# Patient Record
Sex: FEMALE | Race: OTHER | HISPANIC OR LATINO | URBAN - METROPOLITAN AREA
[De-identification: names, ages, dates, MRNs, and addresses within clinical notes are randomized per-mention and may not be internally consistent; named-entity substitution may affect disease eponyms.]

---

## 2017-07-15 ENCOUNTER — EMERGENCY (EMERGENCY)
Facility: HOSPITAL | Age: 42
LOS: 0 days | Discharge: HOME | End: 2017-07-15
Attending: EMERGENCY MEDICINE | Admitting: EMERGENCY MEDICINE
Payer: MEDICAID

## 2017-07-15 VITALS
TEMPERATURE: 98 F | RESPIRATION RATE: 16 BRPM | OXYGEN SATURATION: 100 % | SYSTOLIC BLOOD PRESSURE: 112 MMHG | HEART RATE: 63 BPM | DIASTOLIC BLOOD PRESSURE: 62 MMHG

## 2017-07-15 VITALS
RESPIRATION RATE: 18 BRPM | SYSTOLIC BLOOD PRESSURE: 109 MMHG | HEART RATE: 130 BPM | DIASTOLIC BLOOD PRESSURE: 69 MMHG | TEMPERATURE: 98 F | OXYGEN SATURATION: 100 %
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
## 2017-07-15 DIAGNOSIS — S09.90XA UNSPECIFIED INJURY OF HEAD, INITIAL ENCOUNTER: ICD-10-CM
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
## 2017-07-15 PROCEDURE — 70450 CT HEAD/BRAIN W/O DYE: CPT | Mod: 26

## 2017-07-15 PROCEDURE — 72125 CT NECK SPINE W/O DYE: CPT | Mod: 26

## 2017-07-15 PROCEDURE — 99284 EMERGENCY DEPT VISIT MOD MDM: CPT

## 2017-07-15 RX ORDER — ACETAMINOPHEN 500 MG
1000 TABLET ORAL ONCE
Qty: 0 | Refills: 0 | Status: DISCONTINUED | OUTPATIENT
Start: 2017-07-15 | End: 2017-07-15

## 2017-07-15 NOTE — ED STATDOCS - ATTENDING CONTRIBUTION TO CARE
Attending Contribution to Care: I, Karina Stout, performed the initial face to face bedside interview with this patient regarding history of present illness, review of symptoms and relevant past medical, social and family history.  I completed an independent physical examination.  I was the initial provider who evaluated this patient. I have signed out the follow up of any pending tests (i.e. labs, radiological studies) to the resident.  I have communicated the patient’s plan of care and disposition with the resident.

## 2017-07-15 NOTE — ED STATDOCS - CHPI ED SYMPTOM NEG
no nausea/no blood in stool/no fever/no dysuria/no burning urination/no vomiting/no palpitations/no abdominal distention/no diarrhea/no hematuria

## 2017-07-15 NOTE — ED STATDOCS - OBJECTIVE STATEMENT
41 y/o F presents to the ED c/o head pain. The pt provides that she missed a step yesterday and hit her head. The pt notes that she has left scalp swelling, and generalized headache. No h/o LOC, other trauma, abd pain, nvd, cp, cough, sob, or urinary incontinence.

## 2017-07-15 NOTE — ED STATDOCS - PROGRESS NOTE DETAILS
MD Constantine PGY-4: 43 yo F w/ no pmhx presents w/ headache s/p mechanical fall yesterday. Reports moderate occipital headache that began last night after fall radiating down to b/l neck. Denies visual changes, weakness, nausea. VS with tachcyardia. HR retaken by me, 64 bpm. regular rhythm. MMM. Neuro: GCS 15. Pt alert, NAD, ambulates with ease in ED. normocephalic atraumatic. no spinal tenderness. CN II-XII grossly intact. full strength and sensation b/l. neg pronator drift, normal gait. Plan: pain control, ct head and cspine reassess No acute findingons on CT, rec PMD f/u, reviewed head injury precautions and return precautions -Constantino Rosas PA-C

## 2017-12-16 ENCOUNTER — EMERGENCY (EMERGENCY)
Facility: HOSPITAL | Age: 42
LOS: 0 days | Discharge: HOME | End: 2017-12-16
Attending: EMERGENCY MEDICINE
Payer: COMMERCIAL

## 2017-12-16 VITALS
HEART RATE: 70 BPM | RESPIRATION RATE: 16 BRPM | OXYGEN SATURATION: 99 % | TEMPERATURE: 98 F | DIASTOLIC BLOOD PRESSURE: 60 MMHG | SYSTOLIC BLOOD PRESSURE: 112 MMHG

## 2017-12-16 VITALS — WEIGHT: 130.07 LBS | HEIGHT: 61 IN

## 2017-12-16 DIAGNOSIS — M54.5 LOW BACK PAIN: ICD-10-CM

## 2017-12-16 DIAGNOSIS — M25.551 PAIN IN RIGHT HIP: ICD-10-CM

## 2017-12-16 DIAGNOSIS — N39.0 URINARY TRACT INFECTION, SITE NOT SPECIFIED: ICD-10-CM

## 2017-12-16 LAB
APPEARANCE UR: CLEAR — SIGNIFICANT CHANGE UP
BILIRUB UR-MCNC: NEGATIVE — SIGNIFICANT CHANGE UP
COLOR SPEC: YELLOW — SIGNIFICANT CHANGE UP
DIFF PNL FLD: (no result)
GLUCOSE UR QL: NEGATIVE MG/DL — SIGNIFICANT CHANGE UP
KETONES UR-MCNC: NEGATIVE — SIGNIFICANT CHANGE UP
LEUKOCYTE ESTERASE UR-ACNC: (no result)
NITRITE UR-MCNC: NEGATIVE — SIGNIFICANT CHANGE UP
PH UR: 5 — SIGNIFICANT CHANGE UP (ref 5–8)
PROT UR-MCNC: NEGATIVE MG/DL — SIGNIFICANT CHANGE UP
SP GR SPEC: 1.02 — SIGNIFICANT CHANGE UP (ref 1.01–1.02)
UROBILINOGEN FLD QL: NEGATIVE MG/DL — SIGNIFICANT CHANGE UP

## 2017-12-16 PROCEDURE — 99283 EMERGENCY DEPT VISIT LOW MDM: CPT

## 2017-12-16 PROCEDURE — 72190 X-RAY EXAM OF PELVIS: CPT | Mod: 26

## 2017-12-16 PROCEDURE — 72100 X-RAY EXAM L-S SPINE 2/3 VWS: CPT | Mod: 26

## 2017-12-16 RX ORDER — LIDOCAINE 4 G/100G
1 CREAM TOPICAL ONCE
Qty: 0 | Refills: 0 | Status: COMPLETED | OUTPATIENT
Start: 2017-12-16 | End: 2017-12-16

## 2017-12-16 RX ORDER — NITROFURANTOIN MACROCRYSTAL 50 MG
1 CAPSULE ORAL
Qty: 14 | Refills: 0
Start: 2017-12-16 | End: 2017-12-22

## 2017-12-16 RX ORDER — LIDOCAINE 4 G/100G
1 CREAM TOPICAL
Qty: 5 | Refills: 0
Start: 2017-12-16

## 2017-12-16 RX ORDER — CYCLOBENZAPRINE HYDROCHLORIDE 10 MG/1
1 TABLET, FILM COATED ORAL
Qty: 5 | Refills: 0
Start: 2017-12-16

## 2017-12-16 RX ORDER — NITROFURANTOIN MACROCRYSTAL 50 MG
100 CAPSULE ORAL ONCE
Qty: 0 | Refills: 0 | Status: COMPLETED | OUTPATIENT
Start: 2017-12-16 | End: 2017-12-16

## 2017-12-16 RX ORDER — ACETAMINOPHEN 500 MG
975 TABLET ORAL ONCE
Qty: 0 | Refills: 0 | Status: COMPLETED | OUTPATIENT
Start: 2017-12-16 | End: 2017-12-16

## 2017-12-16 RX ORDER — CYCLOBENZAPRINE HYDROCHLORIDE 10 MG/1
10 TABLET, FILM COATED ORAL ONCE
Qty: 0 | Refills: 0 | Status: DISCONTINUED | OUTPATIENT
Start: 2017-12-16 | End: 2017-12-16

## 2017-12-16 RX ORDER — IBUPROFEN 200 MG
600 TABLET ORAL ONCE
Qty: 0 | Refills: 0 | Status: COMPLETED | OUTPATIENT
Start: 2017-12-16 | End: 2017-12-16

## 2017-12-16 RX ADMIN — Medication 100 MILLIGRAM(S): at 20:35

## 2017-12-16 RX ADMIN — Medication 975 MILLIGRAM(S): at 18:59

## 2017-12-16 RX ADMIN — LIDOCAINE 1 PATCH: 4 CREAM TOPICAL at 19:55

## 2017-12-16 RX ADMIN — Medication 600 MILLIGRAM(S): at 18:59

## 2017-12-16 NOTE — ED STATDOCS - ATTENDING CONTRIBUTION TO CARE
I, Priyank Vega MD,  performed the initial face to face bedside interview with this patient regarding history of present illness, review of symptoms and relevant past medical, social and family history.  I completed an independent physical examination.  I was the initial provider who evaluated this patient. I have signed out the follow up of any pending tests (i.e. labs, radiological studies) to the ACP.  I have communicated the patient’s plan of care and disposition with the ACP.  The history, relevant review of systems, past medical and surgical history, medical decision making, and physical examination was documented by the scribe in my presence and I attest to the accuracy of the documentation.

## 2017-12-16 NOTE — ED STATDOCS - PROGRESS NOTE DETAILS
MAINOR Main:  Pt seen and examined with intake provider, presented with c/o right lower back pain, denies any bowel or bladder incontinence, saddle anesthesia, trauma or LE weakness. Denies any urinary complaint. PE: holding right back, sitting in chair, S1, S2, RRR, no midline spine Tenderness, TTP at the right paraspinal at the lumbar. Plan: xray, pain meds, UA and reeval. MAINOR Price Gadit:  Xray negative, UA positive for UTI, Pain is better. Will dc home.
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
## 2017-12-16 NOTE — ED STATDOCS - NEUROLOGICAL, MLM
sensation is normal and strength is normal.  Sensation grossly intact bilateral lower extremities.  5/5 strength bilateral lower extremities.
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
## 2019-02-20 ENCOUNTER — EMERGENCY (EMERGENCY)
Facility: HOSPITAL | Age: 44
LOS: 1 days | Discharge: HOME | End: 2019-02-20
Attending: EMERGENCY MEDICINE
Payer: MEDICAID

## 2019-02-20 VITALS
OXYGEN SATURATION: 100 % | TEMPERATURE: 100 F | SYSTOLIC BLOOD PRESSURE: 125 MMHG | HEART RATE: 103 BPM | RESPIRATION RATE: 19 BRPM | DIASTOLIC BLOOD PRESSURE: 65 MMHG

## 2019-02-20 VITALS
DIASTOLIC BLOOD PRESSURE: 59 MMHG | OXYGEN SATURATION: 100 % | RESPIRATION RATE: 16 BRPM | SYSTOLIC BLOOD PRESSURE: 103 MMHG | HEART RATE: 69 BPM

## 2019-02-20 DIAGNOSIS — R50.9 FEVER, UNSPECIFIED: ICD-10-CM

## 2019-02-20 DIAGNOSIS — J06.9 ACUTE UPPER RESPIRATORY INFECTION, UNSPECIFIED: ICD-10-CM

## 2019-02-20 LAB
ALBUMIN SERPL ELPH-MCNC: 4 G/DL — SIGNIFICANT CHANGE UP (ref 3.3–5)
ALP SERPL-CCNC: 80 U/L — SIGNIFICANT CHANGE UP (ref 40–120)
ALT FLD-CCNC: 20 U/L — SIGNIFICANT CHANGE UP (ref 12–78)
ANION GAP SERPL CALC-SCNC: 5 MMOL/L — SIGNIFICANT CHANGE UP (ref 5–17)
AST SERPL-CCNC: 10 U/L — LOW (ref 15–37)
BASOPHILS # BLD AUTO: 0.01 K/UL — SIGNIFICANT CHANGE UP (ref 0–0.2)
BASOPHILS NFR BLD AUTO: 0.1 % — SIGNIFICANT CHANGE UP (ref 0–2)
BILIRUB SERPL-MCNC: 0.3 MG/DL — SIGNIFICANT CHANGE UP (ref 0.2–1.2)
BLOOD GAS SOURCE: SIGNIFICANT CHANGE UP
BUN SERPL-MCNC: 8 MG/DL — SIGNIFICANT CHANGE UP (ref 7–23)
CALCIUM SERPL-MCNC: 8.3 MG/DL — LOW (ref 8.5–10.1)
CHLORIDE SERPL-SCNC: 106 MMOL/L — SIGNIFICANT CHANGE UP (ref 96–108)
CO2 SERPL-SCNC: 26 MMOL/L — SIGNIFICANT CHANGE UP (ref 22–31)
COHGB MFR BLDV: 2 % — HIGH (ref 0–1.5)
CREAT SERPL-MCNC: 0.8 MG/DL — SIGNIFICANT CHANGE UP (ref 0.5–1.3)
EOSINOPHIL # BLD AUTO: 0.42 K/UL — SIGNIFICANT CHANGE UP (ref 0–0.5)
EOSINOPHIL NFR BLD AUTO: 5.5 % — SIGNIFICANT CHANGE UP (ref 0–6)
GLUCOSE SERPL-MCNC: 125 MG/DL — HIGH (ref 70–99)
HCT VFR BLD CALC: 38.2 % — SIGNIFICANT CHANGE UP (ref 34.5–45)
HGB BLD-MCNC: 12.9 G/DL — SIGNIFICANT CHANGE UP (ref 11.5–15.5)
IMM GRANULOCYTES NFR BLD AUTO: 0.3 % — SIGNIFICANT CHANGE UP (ref 0–1.5)
LACTATE SERPL-SCNC: 1.6 MMOL/L — SIGNIFICANT CHANGE UP (ref 0.7–2)
LYMPHOCYTES # BLD AUTO: 0.26 K/UL — LOW (ref 1–3.3)
LYMPHOCYTES # BLD AUTO: 3.4 % — LOW (ref 13–44)
MCHC RBC-ENTMCNC: 29.2 PG — SIGNIFICANT CHANGE UP (ref 27–34)
MCHC RBC-ENTMCNC: 33.8 GM/DL — SIGNIFICANT CHANGE UP (ref 32–36)
MCV RBC AUTO: 86.4 FL — SIGNIFICANT CHANGE UP (ref 80–100)
MONOCYTES # BLD AUTO: 0.37 K/UL — SIGNIFICANT CHANGE UP (ref 0–0.9)
MONOCYTES NFR BLD AUTO: 4.8 % — SIGNIFICANT CHANGE UP (ref 2–14)
NEUTROPHILS # BLD AUTO: 6.58 K/UL — SIGNIFICANT CHANGE UP (ref 1.8–7.4)
NEUTROPHILS NFR BLD AUTO: 85.9 % — HIGH (ref 43–77)
NRBC # BLD: 0 /100 WBCS — SIGNIFICANT CHANGE UP (ref 0–0)
PLATELET # BLD AUTO: 222 K/UL — SIGNIFICANT CHANGE UP (ref 150–400)
POTASSIUM SERPL-MCNC: 3.8 MMOL/L — SIGNIFICANT CHANGE UP (ref 3.5–5.3)
POTASSIUM SERPL-SCNC: 3.8 MMOL/L — SIGNIFICANT CHANGE UP (ref 3.5–5.3)
PROT SERPL-MCNC: 8 GM/DL — SIGNIFICANT CHANGE UP (ref 6–8.3)
RBC # BLD: 4.42 M/UL — SIGNIFICANT CHANGE UP (ref 3.8–5.2)
RBC # FLD: 12.4 % — SIGNIFICANT CHANGE UP (ref 10.3–14.5)
SODIUM SERPL-SCNC: 137 MMOL/L — SIGNIFICANT CHANGE UP (ref 135–145)
WBC # BLD: 7.66 K/UL — SIGNIFICANT CHANGE UP (ref 3.8–10.5)
WBC # FLD AUTO: 7.66 K/UL — SIGNIFICANT CHANGE UP (ref 3.8–10.5)

## 2019-02-20 PROCEDURE — 99284 EMERGENCY DEPT VISIT MOD MDM: CPT

## 2019-02-20 PROCEDURE — 93010 ELECTROCARDIOGRAM REPORT: CPT

## 2019-02-20 PROCEDURE — 71046 X-RAY EXAM CHEST 2 VIEWS: CPT | Mod: 26

## 2019-02-20 RX ORDER — ACETAMINOPHEN 500 MG
650 TABLET ORAL ONCE
Qty: 0 | Refills: 0 | Status: COMPLETED | OUTPATIENT
Start: 2019-02-20 | End: 2019-02-20

## 2019-02-20 RX ORDER — SODIUM CHLORIDE 9 MG/ML
1000 INJECTION INTRAMUSCULAR; INTRAVENOUS; SUBCUTANEOUS ONCE
Qty: 0 | Refills: 0 | Status: COMPLETED | OUTPATIENT
Start: 2019-02-20 | End: 2019-02-20

## 2019-02-20 RX ADMIN — Medication 650 MILLIGRAM(S): at 19:13

## 2019-02-20 RX ADMIN — SODIUM CHLORIDE 1000 MILLILITER(S): 9 INJECTION INTRAMUSCULAR; INTRAVENOUS; SUBCUTANEOUS at 18:00

## 2019-02-20 RX ADMIN — SODIUM CHLORIDE 1000 MILLILITER(S): 9 INJECTION INTRAMUSCULAR; INTRAVENOUS; SUBCUTANEOUS at 19:48
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
## 2019-02-20 NOTE — ED STATDOCS - NS ED ROS FT
Constitutional: + fever no chills  Eyes: No visual changes  HEENT: + throat pain  CV: No chest pain  Resp: no SOB + cough  GI: No abd pain, nausea or vomiting  : No dysuria  MSK: No musculoskeletal pain  Skin: No rash  Neuro: + headache

## 2019-02-20 NOTE — ED STATDOCS - NS_ ATTENDINGSCRIBEDETAILS _ED_A_ED_FT
I, Anthony Villanueva MD,  performed the initial face to face bedside interview with this patient regarding history of present illness, review of symptoms and relevant past medical, social and family history.  I completed an independent physical examination.  I was the initial provider who evaluated this patient.  The history, relevant review of systems, past medical and surgical history, medical decision making, and physical examination was documented by the scribe in my presence and I attest to the accuracy of the documentation.

## 2019-02-20 NOTE — ED ADULT NURSE NOTE - NSIMPLEMENTINTERV_GEN_ALL_ED
Implemented All Universal Safety Interventions:  [place] to call system. Call bell, personal items and telephone within reach. Instruct patient to call for assistance. Room bathroom lighting operational. Non-slip footwear when patient is off stretcher. Physically safe environment: no spills, clutter or unnecessary equipment. Stretcher in lowest position, wheels locked, appropriate side rails in place.

## 2019-02-20 NOTE — ED STATDOCS - PHYSICAL EXAMINATION
Constitutional: mild distress AAOx3  Eyes: PERRLA EOMI  Head: Normocephalic atraumatic  Mouth: MMM  Cardiac:  tachycardic  Resp: Lungs CTAB  GI: Abd s/nt/nd  Neuro: CN2-12 intact  Skin: No rashes
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
## 2019-09-22 ENCOUNTER — EMERGENCY (EMERGENCY)
Facility: HOSPITAL | Age: 44
LOS: 0 days | Discharge: HOME | End: 2019-09-22
Attending: EMERGENCY MEDICINE
Payer: MEDICAID

## 2019-09-22 VITALS
OXYGEN SATURATION: 99 % | TEMPERATURE: 98 F | HEART RATE: 66 BPM | DIASTOLIC BLOOD PRESSURE: 66 MMHG | RESPIRATION RATE: 16 BRPM | SYSTOLIC BLOOD PRESSURE: 104 MMHG

## 2019-09-22 VITALS
HEART RATE: 70 BPM | DIASTOLIC BLOOD PRESSURE: 92 MMHG | SYSTOLIC BLOOD PRESSURE: 171 MMHG | TEMPERATURE: 98 F | WEIGHT: 147.93 LBS | HEIGHT: 61 IN | RESPIRATION RATE: 17 BRPM
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
## 2019-09-22 DIAGNOSIS — M25.522 PAIN IN LEFT ELBOW: ICD-10-CM
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
## 2019-09-22 DIAGNOSIS — S53.105A UNSPECIFIED DISLOCATION OF LEFT ULNOHUMERAL JOINT, INITIAL ENCOUNTER: ICD-10-CM
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
## 2019-09-22 PROCEDURE — 73080 X-RAY EXAM OF ELBOW: CPT | Mod: 26,LT,77

## 2019-09-22 PROCEDURE — 73060 X-RAY EXAM OF HUMERUS: CPT | Mod: LT

## 2019-09-22 PROCEDURE — 73060 X-RAY EXAM OF HUMERUS: CPT | Mod: 26,LT

## 2019-09-22 PROCEDURE — 73030 X-RAY EXAM OF SHOULDER: CPT | Mod: 26,LT

## 2019-09-22 PROCEDURE — 73080 X-RAY EXAM OF ELBOW: CPT | Mod: LT

## 2019-09-22 PROCEDURE — 24600 TX CLSD ELBOW DISLC W/O ANES: CPT | Mod: LT

## 2019-09-22 PROCEDURE — 96374 THER/PROPH/DIAG INJ IV PUSH: CPT | Mod: 59

## 2019-09-22 PROCEDURE — 99285 EMERGENCY DEPT VISIT HI MDM: CPT | Mod: 25

## 2019-09-22 PROCEDURE — 73100 X-RAY EXAM OF WRIST: CPT | Mod: 26,LT

## 2019-09-22 PROCEDURE — 73080 X-RAY EXAM OF ELBOW: CPT | Mod: 26,LT,76

## 2019-09-22 PROCEDURE — 99284 EMERGENCY DEPT VISIT MOD MDM: CPT

## 2019-09-22 PROCEDURE — 73100 X-RAY EXAM OF WRIST: CPT | Mod: LT

## 2019-09-22 PROCEDURE — 99284 EMERGENCY DEPT VISIT MOD MDM: CPT | Mod: 25

## 2019-09-22 PROCEDURE — 96375 TX/PRO/DX INJ NEW DRUG ADDON: CPT | Mod: 59

## 2019-09-22 PROCEDURE — 73030 X-RAY EXAM OF SHOULDER: CPT | Mod: LT

## 2019-09-22 RX ORDER — MORPHINE SULFATE 50 MG/1
4 CAPSULE, EXTENDED RELEASE ORAL ONCE
Refills: 0 | Status: DISCONTINUED | OUTPATIENT
Start: 2019-09-22 | End: 2019-09-22

## 2019-09-22 RX ORDER — ONDANSETRON 8 MG/1
4 TABLET, FILM COATED ORAL ONCE
Refills: 0 | Status: COMPLETED | OUTPATIENT
Start: 2019-09-22 | End: 2019-09-22

## 2019-09-22 RX ORDER — HYDROMORPHONE HYDROCHLORIDE 2 MG/ML
1 INJECTION INTRAMUSCULAR; INTRAVENOUS; SUBCUTANEOUS ONCE
Refills: 0 | Status: DISCONTINUED | OUTPATIENT
Start: 2019-09-22 | End: 2019-09-22

## 2019-09-22 RX ADMIN — MORPHINE SULFATE 4 MILLIGRAM(S): 50 CAPSULE, EXTENDED RELEASE ORAL at 08:15

## 2019-09-22 RX ADMIN — HYDROMORPHONE HYDROCHLORIDE 1 MILLIGRAM(S): 2 INJECTION INTRAMUSCULAR; INTRAVENOUS; SUBCUTANEOUS at 11:12

## 2019-09-22 RX ADMIN — MORPHINE SULFATE 4 MILLIGRAM(S): 50 CAPSULE, EXTENDED RELEASE ORAL at 10:00

## 2019-09-22 RX ADMIN — ONDANSETRON 4 MILLIGRAM(S): 8 TABLET, FILM COATED ORAL at 11:12

## 2019-09-22 NOTE — CONSULT NOTE ADULT - ASSESSMENT
******INCOMPLETE NOTE IN PROGRESS******  ******INCOMPLETE NOTE IN PROGRESS******  ******INCOMPLETE NOTE IN PROGRESS****** A/P: 44y F  w/ L Elbow Dislocation  s/p Closed reduction in the ED with Splinting     Case and imaging discussed with Dr. Gonzalez   Successful closed reduction performed, patient was stable from 30 degrees to 110  NWB LUE in splint and sling for comfort  Keep splint c/d/i  FU with Dr. Gonzalez in the office in 1-3 days, call for appointment   analgesia   discussed the signs and symptoms of compartment syndrome. explained to patient is arise to go to nearest ER, pt and  demonstrated understanding  no further ortho intervention at this time  ortho stable for DC  attending aware and agrees with above.
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
## 2019-09-22 NOTE — ED PROVIDER NOTE - OBJECTIVE STATEMENT
45 y/o female with no relevant PMHx presents to the ED s/p trip and fall this morning, landing on left side. No head strike. No LOC. Now c/o left shoulder and left elbow pain. +obvious deformity to left humerus. Denies head pain, neck pain, back pain. Pt was splinted by EMS and brought into ED, in moderate distress.

## 2019-09-22 NOTE — ED ADULT TRIAGE NOTE - CHIEF COMPLAINT QUOTE
Pt alert and oriented x3, pt presents s/p trip and fall. Denies head trauma LOC or blood thinners. Pt c/o 10/10 L wrist pain with deformity. +pulses and sensation intact.

## 2019-09-22 NOTE — ED PROVIDER NOTE - CONSTITUTIONAL, MLM
normal... Well appearing, well nourished, awake, alert, oriented to person, place, time/situation and in moderate distress. Normocephalic, atraumatic.

## 2019-09-22 NOTE — ED PROVIDER NOTE - CARE PROVIDER_API CALL
Mahad Gonzalez)  Orthopaedic Surgery; Surgery of the Hand  [address] [place] [address]  Phone: [phone]  Fax: [phone]  Follow Up Time: 7-10 Days

## 2019-09-22 NOTE — ED PROVIDER NOTE - PROGRESS NOTE DETAILS
Left elbow dislocation -- d/w Mansily. Will eval, resident to initiate eval/workup Dislocation reduced by Lisa -- ok for d/c. To f/u in the office within one week.

## 2019-09-22 NOTE — ED PROVIDER NOTE - PATIENT PORTAL LINK FT
You can access the FollowMyHealth Patient Portal offered by [facility] by registering at the following website: [email or web address]. By joining [employer]’s FollowMyHealth portal, you will also be able to view your health information using other applications (apps) compatible with our system.

## 2020-10-05 ENCOUNTER — APPOINTMENT (OUTPATIENT)
Dept: ANTEPARTUM | Facility: CLINIC | Age: 45
End: 2020-10-05
Payer: MEDICAID

## 2020-10-05 ENCOUNTER — ASOB RESULT (OUTPATIENT)
Age: 45
End: 2020-10-05

## 2020-10-05 PROBLEM — Z00.00 ENCOUNTER FOR PREVENTIVE HEALTH EXAMINATION: Status: ACTIVE | Noted: 2020-10-05

## 2020-10-05 PROCEDURE — 76830 TRANSVAGINAL US NON-OB: CPT

## 2020-10-05 PROCEDURE — 76856 US EXAM PELVIC COMPLETE: CPT | Mod: 59

## 2021-04-15 ENCOUNTER — EMERGENCY (EMERGENCY)
Facility: HOSPITAL | Age: 46
LOS: 0 days | Discharge: HOME | End: 2021-04-15
Attending: HOSPITALIST
Payer: MEDICAID

## 2021-04-15 VITALS
HEART RATE: 65 BPM | RESPIRATION RATE: 17 BRPM | SYSTOLIC BLOOD PRESSURE: 119 MMHG | DIASTOLIC BLOOD PRESSURE: 58 MMHG | OXYGEN SATURATION: 99 % | TEMPERATURE: 98 F

## 2021-04-15 VITALS — HEIGHT: 61 IN | WEIGHT: 160.06 LBS

## 2021-04-15 DIAGNOSIS — R11.10 VOMITING, UNSPECIFIED: ICD-10-CM

## 2021-04-15 DIAGNOSIS — R10.9 UNSPECIFIED ABDOMINAL PAIN: ICD-10-CM

## 2021-04-15 DIAGNOSIS — K52.9 NONINFECTIVE GASTROENTERITIS AND COLITIS, UNSPECIFIED: ICD-10-CM

## 2021-04-15 DIAGNOSIS — E03.9 HYPOTHYROIDISM, UNSPECIFIED: ICD-10-CM

## 2021-04-15 LAB
ALBUMIN SERPL ELPH-MCNC: 3.6 G/DL — SIGNIFICANT CHANGE UP (ref 3.3–5)
ALP SERPL-CCNC: 92 U/L — SIGNIFICANT CHANGE UP (ref 40–120)
ALT FLD-CCNC: 30 U/L — SIGNIFICANT CHANGE UP (ref 12–78)
ANION GAP SERPL CALC-SCNC: 6 MMOL/L — SIGNIFICANT CHANGE UP (ref 5–17)
AST SERPL-CCNC: 17 U/L — SIGNIFICANT CHANGE UP (ref 15–37)
BASOPHILS # BLD AUTO: 0.01 K/UL — SIGNIFICANT CHANGE UP (ref 0–0.2)
BASOPHILS NFR BLD AUTO: 0.1 % — SIGNIFICANT CHANGE UP (ref 0–2)
BILIRUB SERPL-MCNC: 0.2 MG/DL — SIGNIFICANT CHANGE UP (ref 0.2–1.2)
BUN SERPL-MCNC: 16 MG/DL — SIGNIFICANT CHANGE UP (ref 7–23)
CALCIUM SERPL-MCNC: 8 MG/DL — LOW (ref 8.5–10.1)
CHLORIDE SERPL-SCNC: 109 MMOL/L — HIGH (ref 96–108)
CO2 SERPL-SCNC: 23 MMOL/L — SIGNIFICANT CHANGE UP (ref 22–31)
CREAT SERPL-MCNC: 0.89 MG/DL — SIGNIFICANT CHANGE UP (ref 0.5–1.3)
EOSINOPHIL # BLD AUTO: 0.81 K/UL — HIGH (ref 0–0.5)
EOSINOPHIL NFR BLD AUTO: 11.7 % — HIGH (ref 0–6)
GLUCOSE SERPL-MCNC: 102 MG/DL — HIGH (ref 70–99)
HCT VFR BLD CALC: 36.7 % — SIGNIFICANT CHANGE UP (ref 34.5–45)
HGB BLD-MCNC: 11.9 G/DL — SIGNIFICANT CHANGE UP (ref 11.5–15.5)
IMM GRANULOCYTES NFR BLD AUTO: 0.1 % — SIGNIFICANT CHANGE UP (ref 0–1.5)
LIDOCAIN IGE QN: 82 U/L — SIGNIFICANT CHANGE UP (ref 73–393)
LYMPHOCYTES # BLD AUTO: 1.5 K/UL — SIGNIFICANT CHANGE UP (ref 1–3.3)
LYMPHOCYTES # BLD AUTO: 21.6 % — SIGNIFICANT CHANGE UP (ref 13–44)
MCHC RBC-ENTMCNC: 28.3 PG — SIGNIFICANT CHANGE UP (ref 27–34)
MCHC RBC-ENTMCNC: 32.4 GM/DL — SIGNIFICANT CHANGE UP (ref 32–36)
MCV RBC AUTO: 87.2 FL — SIGNIFICANT CHANGE UP (ref 80–100)
MONOCYTES # BLD AUTO: 0.53 K/UL — SIGNIFICANT CHANGE UP (ref 0–0.9)
MONOCYTES NFR BLD AUTO: 7.6 % — SIGNIFICANT CHANGE UP (ref 2–14)
NEUTROPHILS # BLD AUTO: 4.09 K/UL — SIGNIFICANT CHANGE UP (ref 1.8–7.4)
NEUTROPHILS NFR BLD AUTO: 58.9 % — SIGNIFICANT CHANGE UP (ref 43–77)
PLATELET # BLD AUTO: 277 K/UL — SIGNIFICANT CHANGE UP (ref 150–400)
POTASSIUM SERPL-MCNC: 3.6 MMOL/L — SIGNIFICANT CHANGE UP (ref 3.5–5.3)
POTASSIUM SERPL-SCNC: 3.6 MMOL/L — SIGNIFICANT CHANGE UP (ref 3.5–5.3)
PROT SERPL-MCNC: 7.3 GM/DL — SIGNIFICANT CHANGE UP (ref 6–8.3)
RBC # BLD: 4.21 M/UL — SIGNIFICANT CHANGE UP (ref 3.8–5.2)
RBC # FLD: 13 % — SIGNIFICANT CHANGE UP (ref 10.3–14.5)
SODIUM SERPL-SCNC: 138 MMOL/L — SIGNIFICANT CHANGE UP (ref 135–145)
WBC # BLD: 6.95 K/UL — SIGNIFICANT CHANGE UP (ref 3.8–10.5)
WBC # FLD AUTO: 6.95 K/UL — SIGNIFICANT CHANGE UP (ref 3.8–10.5)

## 2021-04-15 PROCEDURE — 85025 COMPLETE CBC W/AUTO DIFF WBC: CPT

## 2021-04-15 PROCEDURE — 99284 EMERGENCY DEPT VISIT MOD MDM: CPT

## 2021-04-15 PROCEDURE — 36415 COLL VENOUS BLD VENIPUNCTURE: CPT

## 2021-04-15 PROCEDURE — 83690 ASSAY OF LIPASE: CPT

## 2021-04-15 PROCEDURE — 96374 THER/PROPH/DIAG INJ IV PUSH: CPT

## 2021-04-15 PROCEDURE — 99284 EMERGENCY DEPT VISIT MOD MDM: CPT | Mod: 25

## 2021-04-15 PROCEDURE — 80053 COMPREHEN METABOLIC PANEL: CPT

## 2021-04-15 RX ORDER — ONDANSETRON 8 MG/1
4 TABLET, FILM COATED ORAL ONCE
Refills: 0 | Status: COMPLETED | OUTPATIENT
Start: 2021-04-15 | End: 2021-04-15

## 2021-04-15 RX ORDER — SODIUM CHLORIDE 9 MG/ML
1000 INJECTION INTRAMUSCULAR; INTRAVENOUS; SUBCUTANEOUS ONCE
Refills: 0 | Status: COMPLETED | OUTPATIENT
Start: 2021-04-15 | End: 2021-04-15

## 2021-04-15 RX ADMIN — ONDANSETRON 4 MILLIGRAM(S): 8 TABLET, FILM COATED ORAL at 02:21

## 2021-04-15 RX ADMIN — SODIUM CHLORIDE 1000 MILLILITER(S): 9 INJECTION INTRAMUSCULAR; INTRAVENOUS; SUBCUTANEOUS at 02:04

## 2021-04-15 NOTE — ED PROVIDER NOTE - CLINICAL SUMMARY MEDICAL DECISION MAKING FREE TEXT BOX
45F with crampy abdominal pain, diarrhea and a few episodes of vomiting. entire family with similar. will obtain labs, hydrate with IVF and give zofran.

## 2021-04-15 NOTE — ED ADULT TRIAGE NOTE - CHIEF COMPLAINT QUOTE
patient c/o abdominal pain.  patient reports family members sick in home with stomach bug.  N/V/D started this morning.  denies fever/chills, urinary sx.

## 2021-04-15 NOTE — ED ADULT NURSE NOTE - OBJECTIVE STATEMENT
Pt presents to ER c/o nausea/vomiting/diarrhea. Onset of symptoms began on Tuesday and became exacerbated last night. Pt reports other family members are sick at home with a stomach bug. AO x 3 oriented to baseline, normal breathing pattern with no difficulty.

## 2021-04-15 NOTE — ED PROVIDER NOTE - PATIENT PORTAL LINK FT
You can access the FollowMyHealth Patient Portal offered by [facility] by registering at the following website: [email or web address]. By joining [employer]’s FollowMyHealth portal, you will also be able to view your health information using other applications (apps) compatible with our system.

## 2021-04-15 NOTE — ED PROVIDER NOTE - OBJECTIVE STATEMENT
PI ID for [place]# [id number]    45F with hx of p/w abdominal pain, vomiting and diarrhea x1 day. patient states she had diarrhea since Tuesday night and then abdominal pain following. reports abdominal pain is worsening and did have some vomiting yesterday. nbnb. no fevers, dysuria. everyone at home with the same symptoms.

## 2021-08-20 PROBLEM — E03.9 HYPOTHYROIDISM, UNSPECIFIED: Chronic | Status: ACTIVE | Noted: 2021-04-15

## 2021-08-27 DIAGNOSIS — Z01.818 ENCOUNTER FOR OTHER PREPROCEDURAL EXAMINATION: ICD-10-CM

## 2021-08-28 ENCOUNTER — APPOINTMENT (OUTPATIENT)
Dept: DISASTER EMERGENCY | Facility: CLINIC | Age: 46
End: 2021-08-28

## 2021-08-29 LAB — SARS-COV-2 N GENE NPH QL NAA+PROBE: NOT DETECTED

## 2021-10-21 ENCOUNTER — OUTPATIENT (OUTPATIENT)
Dept: OUTPATIENT SERVICES | Facility: HOSPITAL | Age: 46
LOS: 1 days | Discharge: HOME | End: 2021-10-21
Payer: OTHER MISCELLANEOUS

## 2021-10-21 VITALS
RESPIRATION RATE: 17 BRPM | WEIGHT: 150.8 LBS | HEART RATE: 73 BPM | TEMPERATURE: 97 F | HEIGHT: 61 IN | DIASTOLIC BLOOD PRESSURE: 71 MMHG | SYSTOLIC BLOOD PRESSURE: 122 MMHG | OXYGEN SATURATION: 99 %

## 2021-10-21 DIAGNOSIS — Z01.812 ENCOUNTER FOR PREPROCEDURAL LABORATORY EXAMINATION: ICD-10-CM

## 2021-10-21 DIAGNOSIS — E07.9 DISORDER OF THYROID, UNSPECIFIED: ICD-10-CM

## 2021-10-21 DIAGNOSIS — M54.17 RADICULOPATHY, LUMBOSACRAL REGION: ICD-10-CM

## 2021-10-21 DIAGNOSIS — Z01.818 ENCOUNTER FOR OTHER PREPROCEDURAL EXAMINATION: ICD-10-CM

## 2021-10-21 DIAGNOSIS — Z98.890 OTHER SPECIFIED POSTPROCEDURAL STATES: Chronic | ICD-10-CM

## 2021-10-21 LAB
ANION GAP SERPL CALC-SCNC: 6 MMOL/L — SIGNIFICANT CHANGE UP (ref 5–17)
APTT BLD: 27.9 SEC — SIGNIFICANT CHANGE UP (ref 27.5–35.5)
BLD GP AB SCN SERPL QL: SIGNIFICANT CHANGE UP
BUN SERPL-MCNC: 7 MG/DL — SIGNIFICANT CHANGE UP (ref 7–23)
CALCIUM SERPL-MCNC: 9.2 MG/DL — SIGNIFICANT CHANGE UP (ref 8.5–10.1)
CHLORIDE SERPL-SCNC: 105 MMOL/L — SIGNIFICANT CHANGE UP (ref 96–108)
CO2 SERPL-SCNC: 26 MMOL/L — SIGNIFICANT CHANGE UP (ref 22–31)
CREAT SERPL-MCNC: 0.5 MG/DL — SIGNIFICANT CHANGE UP (ref 0.5–1.3)
GLUCOSE SERPL-MCNC: 106 MG/DL — HIGH (ref 70–99)
HCG SERPL-ACNC: <1 MIU/ML — SIGNIFICANT CHANGE UP
HCT VFR BLD CALC: 38.8 % — SIGNIFICANT CHANGE UP (ref 34.5–45)
HGB BLD-MCNC: 12.8 G/DL — SIGNIFICANT CHANGE UP (ref 11.5–15.5)
INR BLD: 0.99 RATIO — SIGNIFICANT CHANGE UP (ref 0.88–1.16)
MCHC RBC-ENTMCNC: 28 PG — SIGNIFICANT CHANGE UP (ref 27–34)
MCHC RBC-ENTMCNC: 33 GM/DL — SIGNIFICANT CHANGE UP (ref 32–36)
MCV RBC AUTO: 84.9 FL — SIGNIFICANT CHANGE UP (ref 80–100)
NRBC # BLD: 0 /100 WBCS — SIGNIFICANT CHANGE UP (ref 0–0)
PLATELET # BLD AUTO: 311 K/UL — SIGNIFICANT CHANGE UP (ref 150–400)
POTASSIUM SERPL-MCNC: 3.8 MMOL/L — SIGNIFICANT CHANGE UP (ref 3.5–5.3)
POTASSIUM SERPL-SCNC: 3.8 MMOL/L — SIGNIFICANT CHANGE UP (ref 3.5–5.3)
PROTHROM AB SERPL-ACNC: 11.5 SEC — SIGNIFICANT CHANGE UP (ref 10.6–13.6)
RBC # BLD: 4.57 M/UL — SIGNIFICANT CHANGE UP (ref 3.8–5.2)
RBC # FLD: 13.5 % — SIGNIFICANT CHANGE UP (ref 10.3–14.5)
SODIUM SERPL-SCNC: 137 MMOL/L — SIGNIFICANT CHANGE UP (ref 135–145)
WBC # BLD: 9.15 K/UL — SIGNIFICANT CHANGE UP (ref 3.8–10.5)
WBC # FLD AUTO: 9.15 K/UL — SIGNIFICANT CHANGE UP (ref 3.8–10.5)

## 2021-10-21 PROCEDURE — 93010 ELECTROCARDIOGRAM REPORT: CPT

## 2021-10-21 NOTE — H&P PST ADULT - PROBLEM SELECTOR PLAN 1
Anterior lumbar interbody fusion  labs - cbc,pt/ptt,bmp,t&s,nose cx,ekg  M/C required  cardiac clearance  preop 3 day hibiclens instruction reviewed and given .instructed on if  nose cx positive use mupuricin 5 days and checklist given  take routine meds DOS with sips of water. avoid NSAID and OTC supplements. verbalized understanding  information on proper nutrition , increase protein and better food choices provided in packet

## 2021-10-21 NOTE — H&P PST ADULT - HISTORY OF PRESENT ILLNESS
Patient offered  and declined - requested spouse Henri Pollock to interpret    46F pmh thyroid disease c/o lower back pain after injury while at work on 2-3-2021 found to have lumbosacral spine radiculopathy here for PST for scheduled Anterior lumbar interbody fusion, posterior spinal fusion    This patient denies any fever, cough, sob, flu like symptoms or travel outside of the [place] in the past 30 days  COVID vaccination series completed

## 2021-10-21 NOTE — H&P PST ADULT - ATTENDING COMMENTS
lumbar spondylolisthesis L5-S1 - indicated for alif/psf L5-S1 - r/b/e of the procedure discussed and questions answered - she is well informed and would like to proceed

## 2021-10-21 NOTE — H&P PST ADULT - ASSESSMENT
46F pmh thyroid disease c/o lower back pain after injury while at work on 2-3-2021 found to have lumbosacral spine radiculopathy here for PST for scheduled Anterior lumbar interbody fusion, posterior spinal fusion  CAPRINI SCORE    AGE RELATED RISK FACTORS                                                       MOBILITY RELATED FACTORS  [ x] Age 41-60 years                                            (1 Point)                  [ ] Bed rest                                                        (1 Point)  [ ] Age: 61-74 years                                           (2 Points)                [ ] Plaster cast                                                   (2 Points)  [ ] Age= 75 years                                              (3 Points)                 [ ] Bed bound for more than 72 hours                   (2 Points)    DISEASE RELATED RISK FACTORS                                               GENDER SPECIFIC FACTORS  [ ] Edema in the lower extremities                       (1 Point)                  [ ] Pregnancy                                                     (1 Point)  [ ] Varicose veins                                               (1 Point)                  [ ] Post-partum < 6 weeks                                   (1 Point)             [x] BMI > 25 Kg/m2                                            (1 Point)                  [ ] Hormonal therapy  or oral contraception            (1 Point)                 [ ] Sepsis (in the previous month)                        (1 Point)                  [ ] History of pregnancy complications  [ ] Pneumonia or serious lung disease                                               [ ] Unexplained or recurrent                       (1 Point)           (in the previous month)                               (1 Point)  [ ] Abnormal pulmonary function test                     (1 Point)                 SURGERY RELATED RISK FACTORS  [ ] Acute myocardial infarction                              (1 Point)                 [ ]  Section                                            (1 Point)  [ ] Congestive heart failure (in the previous month)  (1 Point)                 [ ] Minor surgery                                                 (1 Point)   [ ] Inflammatory bowel disease                             (1 Point)                 [ ] Arthroscopic surgery                                        (2 Points)  [ ] Central venous access                                    (2 Points)                [x ] General surgery lasting more than 45 minutes   (2 Points)       [ ] Stroke (in the previous month)                          (5 Points)               [ ] Elective arthroplasty                                        (5 Points)                                                                                                                                               HEMATOLOGY RELATED FACTORS                                                 TRAUMA RELATED RISK FACTORS  [ ] Prior episodes of VTE                                     (3 Points)                 [ ] Fracture of the hip, pelvis, or leg                       (5 Points)  [ ] Positive family history for VTE                         (3 Points)                 [ ] Acute spinal cord injury (in the previous month)  (5 Points)  [ ] Prothrombin 35225 A                                      (3 Points)                 [ ] Paralysis  (less than 1 month)                          (5 Points)  [ ] Factor V Leiden                                             (3 Points)                 [ ] Multiple Trauma within 1 month                         (5 Points)  [ ] Lupus anticoagulants                                     (3 Points)                                                           [ ] Anticardiolipin antibodies                                (3 Points)                                                       [ ] High homocysteine in the blood                      (3 Points)                                             [ ] Other congenital or acquired thrombophilia       (3 Points)                                                [ ] Heparin induced thrombocytopenia                  (3 Points)                                          Total Score [       4   ]

## 2021-10-22 LAB
A1C WITH ESTIMATED AVERAGE GLUCOSE RESULT: 5.7 % — HIGH (ref 4–5.6)
ESTIMATED AVERAGE GLUCOSE: 117 MG/DL — HIGH (ref 68–114)
MRSA PCR RESULT.: SIGNIFICANT CHANGE UP
S AUREUS DNA NOSE QL NAA+PROBE: SIGNIFICANT CHANGE UP

## 2021-11-06 ENCOUNTER — APPOINTMENT (OUTPATIENT)
Dept: DISASTER EMERGENCY | Facility: CLINIC | Age: 46
End: 2021-11-06

## 2021-11-07 LAB — SARS-COV-2 N GENE NPH QL NAA+PROBE: NOT DETECTED

## 2021-11-08 ENCOUNTER — TRANSCRIPTION ENCOUNTER (OUTPATIENT)
Age: 46
End: 2021-11-08

## 2021-11-09 ENCOUNTER — INPATIENT (INPATIENT)
Facility: HOSPITAL | Age: 46
LOS: 3 days | Discharge: HOME | End: 2021-11-13
Attending: ORTHOPAEDIC SURGERY | Admitting: ORTHOPAEDIC SURGERY

## 2021-11-09 ENCOUNTER — TRANSCRIPTION ENCOUNTER (OUTPATIENT)
Age: 46
End: 2021-11-09

## 2021-11-09 VITALS
TEMPERATURE: 99 F | SYSTOLIC BLOOD PRESSURE: 117 MMHG | OXYGEN SATURATION: 98 % | WEIGHT: 150.8 LBS | DIASTOLIC BLOOD PRESSURE: 75 MMHG | RESPIRATION RATE: 18 BRPM | HEART RATE: 71 BPM | HEIGHT: 61 IN

## 2021-11-09 DIAGNOSIS — Z98.890 OTHER SPECIFIED POSTPROCEDURAL STATES: Chronic | ICD-10-CM

## 2021-11-09 LAB
ANION GAP SERPL CALC-SCNC: 5 MMOL/L — SIGNIFICANT CHANGE UP (ref 5–17)
BASOPHILS # BLD AUTO: 0.04 K/UL — SIGNIFICANT CHANGE UP (ref 0–0.2)
BASOPHILS NFR BLD AUTO: 0.2 % — SIGNIFICANT CHANGE UP (ref 0–2)
BLD GP AB SCN SERPL QL: SIGNIFICANT CHANGE UP
BUN SERPL-MCNC: 9 MG/DL — SIGNIFICANT CHANGE UP (ref 7–23)
CALCIUM SERPL-MCNC: 8.3 MG/DL — LOW (ref 8.5–10.1)
CHLORIDE SERPL-SCNC: 107 MMOL/L — SIGNIFICANT CHANGE UP (ref 96–108)
CO2 SERPL-SCNC: 21 MMOL/L — LOW (ref 22–31)
CREAT SERPL-MCNC: 0.58 MG/DL — SIGNIFICANT CHANGE UP (ref 0.5–1.3)
EOSINOPHIL # BLD AUTO: 0.16 K/UL — SIGNIFICANT CHANGE UP (ref 0–0.5)
EOSINOPHIL NFR BLD AUTO: 0.9 % — SIGNIFICANT CHANGE UP (ref 0–6)
GLUCOSE SERPL-MCNC: 128 MG/DL — HIGH (ref 70–99)
HCG UR QL: NEGATIVE — SIGNIFICANT CHANGE UP
HCT VFR BLD CALC: 41.1 % — SIGNIFICANT CHANGE UP (ref 34.5–45)
HGB BLD-MCNC: 13.2 G/DL — SIGNIFICANT CHANGE UP (ref 11.5–15.5)
IMM GRANULOCYTES NFR BLD AUTO: 0.6 % — SIGNIFICANT CHANGE UP (ref 0–1.5)
LYMPHOCYTES # BLD AUTO: 1.3 K/UL — SIGNIFICANT CHANGE UP (ref 1–3.3)
LYMPHOCYTES # BLD AUTO: 7.7 % — LOW (ref 13–44)
MCHC RBC-ENTMCNC: 28.1 PG — SIGNIFICANT CHANGE UP (ref 27–34)
MCHC RBC-ENTMCNC: 32.1 GM/DL — SIGNIFICANT CHANGE UP (ref 32–36)
MCV RBC AUTO: 87.4 FL — SIGNIFICANT CHANGE UP (ref 80–100)
MONOCYTES # BLD AUTO: 0.23 K/UL — SIGNIFICANT CHANGE UP (ref 0–0.9)
MONOCYTES NFR BLD AUTO: 1.4 % — LOW (ref 2–14)
NEUTROPHILS # BLD AUTO: 15.15 K/UL — HIGH (ref 1.8–7.4)
NEUTROPHILS NFR BLD AUTO: 89.2 % — HIGH (ref 43–77)
NRBC # BLD: 0 /100 WBCS — SIGNIFICANT CHANGE UP (ref 0–0)
PLATELET # BLD AUTO: 268 K/UL — SIGNIFICANT CHANGE UP (ref 150–400)
POTASSIUM SERPL-MCNC: 4.1 MMOL/L — SIGNIFICANT CHANGE UP (ref 3.5–5.3)
POTASSIUM SERPL-SCNC: 4.1 MMOL/L — SIGNIFICANT CHANGE UP (ref 3.5–5.3)
RBC # BLD: 4.7 M/UL — SIGNIFICANT CHANGE UP (ref 3.8–5.2)
RBC # FLD: 12.8 % — SIGNIFICANT CHANGE UP (ref 10.3–14.5)
SODIUM SERPL-SCNC: 133 MMOL/L — LOW (ref 135–145)
WBC # BLD: 16.99 K/UL — HIGH (ref 3.8–10.5)
WBC # FLD AUTO: 16.99 K/UL — HIGH (ref 3.8–10.5)

## 2021-11-09 RX ORDER — METOCLOPRAMIDE HCL 10 MG
10 TABLET ORAL ONCE
Refills: 0 | Status: COMPLETED | OUTPATIENT
Start: 2021-11-09 | End: 2021-11-09

## 2021-11-09 RX ORDER — CEFAZOLIN SODIUM 1 G
2000 VIAL (EA) INJECTION ONCE
Refills: 0 | Status: COMPLETED | OUTPATIENT
Start: 2021-11-09 | End: 2021-11-09

## 2021-11-09 RX ORDER — SODIUM CHLORIDE 9 MG/ML
3 INJECTION INTRAMUSCULAR; INTRAVENOUS; SUBCUTANEOUS EVERY 8 HOURS
Refills: 0 | Status: DISCONTINUED | OUTPATIENT
Start: 2021-11-09 | End: 2021-11-09

## 2021-11-09 RX ORDER — DIPHENHYDRAMINE HCL 50 MG
12.5 CAPSULE ORAL EVERY 4 HOURS
Refills: 0 | Status: DISCONTINUED | OUTPATIENT
Start: 2021-11-09 | End: 2021-11-13

## 2021-11-09 RX ORDER — SODIUM CHLORIDE 9 MG/ML
1000 INJECTION, SOLUTION INTRAVENOUS
Refills: 0 | Status: DISCONTINUED | OUTPATIENT
Start: 2021-11-09 | End: 2021-11-12

## 2021-11-09 RX ORDER — FOLIC ACID 0.8 MG
1 TABLET ORAL DAILY
Refills: 0 | Status: DISCONTINUED | OUTPATIENT
Start: 2021-11-09 | End: 2021-11-13

## 2021-11-09 RX ORDER — LEVOTHYROXINE SODIUM 125 MCG
1 TABLET ORAL
Qty: 0 | Refills: 0 | DISCHARGE

## 2021-11-09 RX ORDER — SENNA PLUS 8.6 MG/1
2 TABLET ORAL AT BEDTIME
Refills: 0 | Status: DISCONTINUED | OUTPATIENT
Start: 2021-11-09 | End: 2021-11-13

## 2021-11-09 RX ORDER — HYDROMORPHONE HYDROCHLORIDE 2 MG/ML
0.5 INJECTION INTRAMUSCULAR; INTRAVENOUS; SUBCUTANEOUS
Refills: 0 | Status: DISCONTINUED | OUTPATIENT
Start: 2021-11-09 | End: 2021-11-09

## 2021-11-09 RX ORDER — HYDROMORPHONE HYDROCHLORIDE 2 MG/ML
0.5 INJECTION INTRAMUSCULAR; INTRAVENOUS; SUBCUTANEOUS
Refills: 0 | Status: DISCONTINUED | OUTPATIENT
Start: 2021-11-09 | End: 2021-11-13

## 2021-11-09 RX ORDER — OXYCODONE HYDROCHLORIDE 5 MG/1
5 TABLET ORAL EVERY 4 HOURS
Refills: 0 | Status: DISCONTINUED | OUTPATIENT
Start: 2021-11-09 | End: 2021-11-13

## 2021-11-09 RX ORDER — SODIUM CHLORIDE 9 MG/ML
1000 INJECTION, SOLUTION INTRAVENOUS
Refills: 0 | Status: DISCONTINUED | OUTPATIENT
Start: 2021-11-09 | End: 2021-11-09

## 2021-11-09 RX ORDER — POLYETHYLENE GLYCOL 3350 17 G/17G
17 POWDER, FOR SOLUTION ORAL
Refills: 0 | Status: DISCONTINUED | OUTPATIENT
Start: 2021-11-09 | End: 2021-11-13

## 2021-11-09 RX ORDER — CYCLOBENZAPRINE HYDROCHLORIDE 10 MG/1
10 TABLET, FILM COATED ORAL EVERY 8 HOURS
Refills: 0 | Status: DISCONTINUED | OUTPATIENT
Start: 2021-11-09 | End: 2021-11-13

## 2021-11-09 RX ORDER — ONDANSETRON 8 MG/1
4 TABLET, FILM COATED ORAL ONCE
Refills: 0 | Status: COMPLETED | OUTPATIENT
Start: 2021-11-09 | End: 2021-11-09

## 2021-11-09 RX ORDER — LEVOTHYROXINE SODIUM 125 MCG
25 TABLET ORAL DAILY
Refills: 0 | Status: DISCONTINUED | OUTPATIENT
Start: 2021-11-09 | End: 2021-11-13

## 2021-11-09 RX ORDER — ASCORBIC ACID 60 MG
500 TABLET,CHEWABLE ORAL
Refills: 0 | Status: DISCONTINUED | OUTPATIENT
Start: 2021-11-09 | End: 2021-11-13

## 2021-11-09 RX ORDER — ACETAMINOPHEN 500 MG
975 TABLET ORAL EVERY 8 HOURS
Refills: 0 | Status: DISCONTINUED | OUTPATIENT
Start: 2021-11-09 | End: 2021-11-13

## 2021-11-09 RX ORDER — OXYCODONE HYDROCHLORIDE 5 MG/1
10 TABLET ORAL EVERY 4 HOURS
Refills: 0 | Status: DISCONTINUED | OUTPATIENT
Start: 2021-11-09 | End: 2021-11-13

## 2021-11-09 RX ADMIN — HYDROMORPHONE HYDROCHLORIDE 0.5 MILLIGRAM(S): 2 INJECTION INTRAMUSCULAR; INTRAVENOUS; SUBCUTANEOUS at 13:37

## 2021-11-09 RX ADMIN — OXYCODONE HYDROCHLORIDE 10 MILLIGRAM(S): 5 TABLET ORAL at 22:59

## 2021-11-09 RX ADMIN — HYDROMORPHONE HYDROCHLORIDE 0.5 MILLIGRAM(S): 2 INJECTION INTRAMUSCULAR; INTRAVENOUS; SUBCUTANEOUS at 23:42

## 2021-11-09 RX ADMIN — Medication 500 MILLIGRAM(S): at 18:13

## 2021-11-09 RX ADMIN — HYDROMORPHONE HYDROCHLORIDE 0.5 MILLIGRAM(S): 2 INJECTION INTRAMUSCULAR; INTRAVENOUS; SUBCUTANEOUS at 13:52

## 2021-11-09 RX ADMIN — Medication 10 MILLIGRAM(S): at 23:41

## 2021-11-09 RX ADMIN — SODIUM CHLORIDE 75 MILLILITER(S): 9 INJECTION, SOLUTION INTRAVENOUS at 18:12

## 2021-11-09 RX ADMIN — Medication 10 MILLIGRAM(S): at 15:41

## 2021-11-09 RX ADMIN — HYDROMORPHONE HYDROCHLORIDE 0.5 MILLIGRAM(S): 2 INJECTION INTRAMUSCULAR; INTRAVENOUS; SUBCUTANEOUS at 15:16

## 2021-11-09 RX ADMIN — Medication 100 MILLIGRAM(S): at 21:37

## 2021-11-09 RX ADMIN — OXYCODONE HYDROCHLORIDE 10 MILLIGRAM(S): 5 TABLET ORAL at 23:59

## 2021-11-09 RX ADMIN — HYDROMORPHONE HYDROCHLORIDE 0.5 MILLIGRAM(S): 2 INJECTION INTRAMUSCULAR; INTRAVENOUS; SUBCUTANEOUS at 13:23

## 2021-11-09 RX ADMIN — SODIUM CHLORIDE 75 MILLILITER(S): 9 INJECTION, SOLUTION INTRAVENOUS at 13:07

## 2021-11-09 RX ADMIN — HYDROMORPHONE HYDROCHLORIDE 0.5 MILLIGRAM(S): 2 INJECTION INTRAMUSCULAR; INTRAVENOUS; SUBCUTANEOUS at 13:58

## 2021-11-09 RX ADMIN — HYDROMORPHONE HYDROCHLORIDE 0.5 MILLIGRAM(S): 2 INJECTION INTRAMUSCULAR; INTRAVENOUS; SUBCUTANEOUS at 14:12

## 2021-11-09 RX ADMIN — POLYETHYLENE GLYCOL 3350 17 GRAM(S): 17 POWDER, FOR SOLUTION ORAL at 18:13

## 2021-11-09 RX ADMIN — HYDROMORPHONE HYDROCHLORIDE 0.5 MILLIGRAM(S): 2 INJECTION INTRAMUSCULAR; INTRAVENOUS; SUBCUTANEOUS at 14:13

## 2021-11-09 RX ADMIN — ONDANSETRON 4 MILLIGRAM(S): 8 TABLET, FILM COATED ORAL at 17:29

## 2021-11-09 RX ADMIN — HYDROMORPHONE HYDROCHLORIDE 0.5 MILLIGRAM(S): 2 INJECTION INTRAMUSCULAR; INTRAVENOUS; SUBCUTANEOUS at 13:36

## 2021-11-09 NOTE — DISCHARGE NOTE PROVIDER - HOSPITAL COURSE
46yFemale with history of lumbar radiculopathy L5-S1 presenting for ALIF/PSF L5-S1 by Dr Breezy Tate on  11/9/2021. Risk and benefits of surgery were explained to the patient. The patient understood and agreed to proceed with surgery. Patient underwent the procedure with no intraoperative complications. Pt was brought in stable condition to the PACU. Once stable in PACU, pt was brought to the floor. During hospital stay pt was followed by Medicine,  during this admission. Pt had an uneventful hospital course. Pt is stable for discharge to home on POD# 46yFemale with history of lumbar radiculopathy L5-S1 presenting for ALIF/PSF L5-S1 by Dr Breezy Tate on  11/9/2021. Risk and benefits of surgery were explained to the patient. The patient understood and agreed to proceed with surgery. Patient underwent the procedure with no intraoperative complications. Pt was brought in stable condition to the PACU. Once stable in PACU, pt was brought to the floor. During hospital stay pt was followed by Medicine,  during this admission. Pt had an uneventful hospital course. Pt is stable for discharge to home on POD# 3

## 2021-11-09 NOTE — DISCHARGE NOTE PROVIDER - NSDCMRMEDTOKEN_GEN_ALL_CORE_FT
mg oral tablet:   levothyroxine 25 mcg (0.025 mg) oral capsule: 1 cap(s) orally once a day  Tylenol 325 mg oral tablet:    acetaminophen 325 mg oral tablet: 3 tab(s) orally every 8 hours  ascorbic acid 500 mg oral tablet: 1 tab(s) orally 2 times a day  cyclobenzaprine 10 mg oral tablet: 1 tab(s) orally every 8 hours MDD:3  levothyroxine 25 mcg (0.025 mg) oral capsule: 1 cap(s) orally once a day  Multiple Vitamins oral tablet: 1 tab(s) orally once a day  oxyCODONE 5 mg oral tablet: 1- 2 tab(s) orally every 4 hours, As Needed -Pain MDD:10  polyethylene glycol 3350 oral powder for reconstitution: 17 gram(s) orally 2 times a day  senna oral tablet: 2 tab(s) orally once a day (at bedtime)

## 2021-11-09 NOTE — DISCHARGE NOTE PROVIDER - CARE PROVIDER_API CALL
Breezy Tate)  Orthopaedic Surgery  [address] [place] [address]  Phone: [phone]  Fax: [phone]  Follow Up Time:

## 2021-11-09 NOTE — PATIENT PROFILE ADULT - LANGUAGE ASSISTANCE NEEDED
pt   speaks and understand English pt   speaks and understand English/Yes-Patient/Caregiver accepts free interpretation services...

## 2021-11-09 NOTE — DISCHARGE NOTE PROVIDER - NSDCFUADDAPPT_GEN_ALL_CORE_FT
Follow up with your surgeon in two weeks. Call for appointment.  If you need more pain medication, call your surgeon's office. For medication refills or authorizations, please call [phone]  We recommend that you call and schedule a follow up appointment with your primary care physician for repeat blood work (CBC and BMP) for post hospital discharge follow-up care 2-4 weeks after your surgery.   Call your surgeon if you have increased redness/pain/drainage or fever. Return to ER for shortness of breath/calf tenderness.

## 2021-11-09 NOTE — DISCHARGE NOTE PROVIDER - NSDCCPTREATMENT_GEN_ALL_CORE_FT
PRINCIPAL PROCEDURE  Procedure: Fusion, spine, lumbar, ALIF  Findings and Treatment: PSF/ALIF L5-S1

## 2021-11-09 NOTE — DISCHARGE NOTE PROVIDER - NSDCFUADDINST_GEN_ALL_CORE_FT
No bending/lifting/twisting/pulling/pushing/carrying or driving. No blood thinners (not limited to but including) aspirin, motrin, alleve, naproxen, etc.    May shower 5 days post op.  No direct water pressure over incision.  Change dressing daily as needed with a dry clean bandage.  No bending/lifting/twisting/pulling/pushing/carrying or driving. No blood thinners (not limited to but including) aspirin, motrin, alleve, naproxen, etc.    May shower 5 days after surgery (Sunday 11/14)  No direct water pressure over incision.  Change dressing daily as needed with a dry clean bandage.

## 2021-11-09 NOTE — CONSULT NOTE ADULT - SUBJECTIVE AND OBJECTIVE BOX
ISABELLE ORONA is a 46y Female s/p ANTERIOR LUMBAR INTERBODY FUSION L5 S1 POSTERIOR SPINAL FUSI      w/ h/o No pertinent past medical history    Hypothyroidism      denies any chest pain shortness of breath palpitation dizziness lightheadedness nausea vomiting fever or chills    No significant past surgical history    No significant past surgical history    H/O right knee surgery        SH: doesnot smoke or drink at this time    No Known Allergies    acetaminophen     Tablet .. 975 milliGRAM(s) Oral every 8 hours  ascorbic acid 500 milliGRAM(s) Oral two times a day  ceFAZolin   IVPB 2000 milliGRAM(s) IV Intermittent once  cyclobenzaprine 10 milliGRAM(s) Oral every 8 hours  diphenhydrAMINE Injectable 12.5 milliGRAM(s) IV Push every 4 hours PRN  folic acid 1 milliGRAM(s) Oral daily  HYDROmorphone  Injectable 0.5 milliGRAM(s) IV Push every 3 hours PRN  lactated ringers. 1000 milliLiter(s) IV Continuous <Continuous>  levothyroxine 25 MICROGram(s) Oral daily  metoclopramide Injectable 10 milliGRAM(s) IV Push once PRN  multivitamin 1 Tablet(s) Oral daily  oxyCODONE    IR 5 milliGRAM(s) Oral every 4 hours PRN  oxyCODONE    IR 10 milliGRAM(s) Oral every 4 hours PRN  polyethylene glycol 3350 17 Gram(s) Oral two times a day  senna 2 Tablet(s) Oral at bedtime    T(C): 36.7 (11-09-21 @ 18:29), Max: 37 (11-09-21 @ 07:25)  HR: 75 (11-09-21 @ 18:29) (68 - 90)  BP: 100/68 (11-09-21 @ 18:29) (100/68 - 131/78)  RR: 17 (11-09-21 @ 18:29) (12 - 19)  SpO2: 98% (11-09-21 @ 18:29) (95% - 100%)  HEENT unremarkable  neck no JVD or bruit  heart normal S1 S2 RRR no gallops or rubs  chest clear to auscultation  abd sof nontender non distended +bs  ext no calf tenderness    A/P   DVT PX  pain control  bowel regimen   wound care as per ortho  GI PX  antiemetics prn  incentive spirometer

## 2021-11-10 LAB
ANION GAP SERPL CALC-SCNC: 4 MMOL/L — LOW (ref 5–17)
BASOPHILS # BLD AUTO: 0.02 K/UL — SIGNIFICANT CHANGE UP (ref 0–0.2)
BASOPHILS NFR BLD AUTO: 0.2 % — SIGNIFICANT CHANGE UP (ref 0–2)
BUN SERPL-MCNC: 7 MG/DL — SIGNIFICANT CHANGE UP (ref 7–23)
CALCIUM SERPL-MCNC: 8.1 MG/DL — LOW (ref 8.5–10.1)
CHLORIDE SERPL-SCNC: 104 MMOL/L — SIGNIFICANT CHANGE UP (ref 96–108)
CO2 SERPL-SCNC: 28 MMOL/L — SIGNIFICANT CHANGE UP (ref 22–31)
COVID-19 NUCLEOCAPSID GAM AB INTERP: NEGATIVE — SIGNIFICANT CHANGE UP
COVID-19 NUCLEOCAPSID TOTAL GAM ANTIBODY RESULT: 0.41 INDEX — SIGNIFICANT CHANGE UP
COVID-19 SPIKE DOMAIN AB INTERP: POSITIVE
COVID-19 SPIKE DOMAIN ANTIBODY RESULT: >250 U/ML — HIGH
CREAT SERPL-MCNC: 0.54 MG/DL — SIGNIFICANT CHANGE UP (ref 0.5–1.3)
EOSINOPHIL # BLD AUTO: 0.01 K/UL — SIGNIFICANT CHANGE UP (ref 0–0.5)
EOSINOPHIL NFR BLD AUTO: 0.1 % — SIGNIFICANT CHANGE UP (ref 0–6)
GLUCOSE SERPL-MCNC: 131 MG/DL — HIGH (ref 70–99)
HCT VFR BLD CALC: 30.2 % — LOW (ref 34.5–45)
HCT VFR BLD CALC: 33.1 % — LOW (ref 34.5–45)
HGB BLD-MCNC: 10 G/DL — LOW (ref 11.5–15.5)
HGB BLD-MCNC: 10.8 G/DL — LOW (ref 11.5–15.5)
IMM GRANULOCYTES NFR BLD AUTO: 0.4 % — SIGNIFICANT CHANGE UP (ref 0–1.5)
LYMPHOCYTES # BLD AUTO: 1.33 K/UL — SIGNIFICANT CHANGE UP (ref 1–3.3)
LYMPHOCYTES # BLD AUTO: 13 % — SIGNIFICANT CHANGE UP (ref 13–44)
MCHC RBC-ENTMCNC: 28.1 PG — SIGNIFICANT CHANGE UP (ref 27–34)
MCHC RBC-ENTMCNC: 28.9 PG — SIGNIFICANT CHANGE UP (ref 27–34)
MCHC RBC-ENTMCNC: 32.6 GM/DL — SIGNIFICANT CHANGE UP (ref 32–36)
MCHC RBC-ENTMCNC: 33.1 GM/DL — SIGNIFICANT CHANGE UP (ref 32–36)
MCV RBC AUTO: 86.2 FL — SIGNIFICANT CHANGE UP (ref 80–100)
MCV RBC AUTO: 87.3 FL — SIGNIFICANT CHANGE UP (ref 80–100)
MONOCYTES # BLD AUTO: 0.57 K/UL — SIGNIFICANT CHANGE UP (ref 0–0.9)
MONOCYTES NFR BLD AUTO: 5.6 % — SIGNIFICANT CHANGE UP (ref 2–14)
NEUTROPHILS # BLD AUTO: 8.25 K/UL — HIGH (ref 1.8–7.4)
NEUTROPHILS NFR BLD AUTO: 80.7 % — HIGH (ref 43–77)
NRBC # BLD: 0 /100 WBCS — SIGNIFICANT CHANGE UP (ref 0–0)
NRBC # BLD: 0 /100 WBCS — SIGNIFICANT CHANGE UP (ref 0–0)
PLATELET # BLD AUTO: 230 K/UL — SIGNIFICANT CHANGE UP (ref 150–400)
PLATELET # BLD AUTO: 259 K/UL — SIGNIFICANT CHANGE UP (ref 150–400)
POTASSIUM SERPL-MCNC: 3.4 MMOL/L — LOW (ref 3.5–5.3)
POTASSIUM SERPL-SCNC: 3.4 MMOL/L — LOW (ref 3.5–5.3)
RBC # BLD: 3.46 M/UL — LOW (ref 3.8–5.2)
RBC # BLD: 3.84 M/UL — SIGNIFICANT CHANGE UP (ref 3.8–5.2)
RBC # FLD: 13 % — SIGNIFICANT CHANGE UP (ref 10.3–14.5)
RBC # FLD: 13.2 % — SIGNIFICANT CHANGE UP (ref 10.3–14.5)
SARS-COV-2 IGG+IGM SERPL QL IA: 0.41 INDEX — SIGNIFICANT CHANGE UP
SARS-COV-2 IGG+IGM SERPL QL IA: >250 U/ML — HIGH
SARS-COV-2 IGG+IGM SERPL QL IA: NEGATIVE — SIGNIFICANT CHANGE UP
SARS-COV-2 IGG+IGM SERPL QL IA: POSITIVE
SODIUM SERPL-SCNC: 136 MMOL/L — SIGNIFICANT CHANGE UP (ref 135–145)
WBC # BLD: 10.22 K/UL — SIGNIFICANT CHANGE UP (ref 3.8–10.5)
WBC # BLD: 9.06 K/UL — SIGNIFICANT CHANGE UP (ref 3.8–10.5)
WBC # FLD AUTO: 10.22 K/UL — SIGNIFICANT CHANGE UP (ref 3.8–10.5)
WBC # FLD AUTO: 9.06 K/UL — SIGNIFICANT CHANGE UP (ref 3.8–10.5)

## 2021-11-10 RX ORDER — POTASSIUM CHLORIDE 20 MEQ
20 PACKET (EA) ORAL ONCE
Refills: 0 | Status: COMPLETED | OUTPATIENT
Start: 2021-11-10 | End: 2021-11-10

## 2021-11-10 RX ORDER — SODIUM CHLORIDE 9 MG/ML
1000 INJECTION INTRAMUSCULAR; INTRAVENOUS; SUBCUTANEOUS ONCE
Refills: 0 | Status: COMPLETED | OUTPATIENT
Start: 2021-11-10 | End: 2021-11-10

## 2021-11-10 RX ADMIN — POLYETHYLENE GLYCOL 3350 17 GRAM(S): 17 POWDER, FOR SOLUTION ORAL at 19:02

## 2021-11-10 RX ADMIN — OXYCODONE HYDROCHLORIDE 10 MILLIGRAM(S): 5 TABLET ORAL at 15:55

## 2021-11-10 RX ADMIN — Medication 975 MILLIGRAM(S): at 07:38

## 2021-11-10 RX ADMIN — Medication 1 TABLET(S): at 12:13

## 2021-11-10 RX ADMIN — OXYCODONE HYDROCHLORIDE 10 MILLIGRAM(S): 5 TABLET ORAL at 22:56

## 2021-11-10 RX ADMIN — Medication 25 MICROGRAM(S): at 06:35

## 2021-11-10 RX ADMIN — HYDROMORPHONE HYDROCHLORIDE 0.5 MILLIGRAM(S): 2 INJECTION INTRAMUSCULAR; INTRAVENOUS; SUBCUTANEOUS at 00:12

## 2021-11-10 RX ADMIN — Medication 1 MILLIGRAM(S): at 12:13

## 2021-11-10 RX ADMIN — OXYCODONE HYDROCHLORIDE 10 MILLIGRAM(S): 5 TABLET ORAL at 16:51

## 2021-11-10 RX ADMIN — SODIUM CHLORIDE 500 MILLILITER(S): 9 INJECTION INTRAMUSCULAR; INTRAVENOUS; SUBCUTANEOUS at 07:30

## 2021-11-10 RX ADMIN — Medication 975 MILLIGRAM(S): at 08:17

## 2021-11-10 RX ADMIN — OXYCODONE HYDROCHLORIDE 10 MILLIGRAM(S): 5 TABLET ORAL at 04:20

## 2021-11-10 RX ADMIN — Medication 500 MILLIGRAM(S): at 07:38

## 2021-11-10 RX ADMIN — Medication 20 MILLIEQUIVALENT(S): at 09:44

## 2021-11-10 RX ADMIN — CYCLOBENZAPRINE HYDROCHLORIDE 10 MILLIGRAM(S): 10 TABLET, FILM COATED ORAL at 13:15

## 2021-11-10 RX ADMIN — OXYCODONE HYDROCHLORIDE 10 MILLIGRAM(S): 5 TABLET ORAL at 09:44

## 2021-11-10 RX ADMIN — SODIUM CHLORIDE 75 MILLILITER(S): 9 INJECTION, SOLUTION INTRAVENOUS at 09:35

## 2021-11-10 RX ADMIN — CYCLOBENZAPRINE HYDROCHLORIDE 10 MILLIGRAM(S): 10 TABLET, FILM COATED ORAL at 21:56

## 2021-11-10 RX ADMIN — OXYCODONE HYDROCHLORIDE 10 MILLIGRAM(S): 5 TABLET ORAL at 10:40

## 2021-11-10 RX ADMIN — OXYCODONE HYDROCHLORIDE 10 MILLIGRAM(S): 5 TABLET ORAL at 21:56

## 2021-11-10 RX ADMIN — OXYCODONE HYDROCHLORIDE 10 MILLIGRAM(S): 5 TABLET ORAL at 05:20

## 2021-11-10 RX ADMIN — Medication 500 MILLIGRAM(S): at 19:02

## 2021-11-10 RX ADMIN — Medication 975 MILLIGRAM(S): at 17:01

## 2021-11-10 RX ADMIN — SENNA PLUS 2 TABLET(S): 8.6 TABLET ORAL at 21:56

## 2021-11-10 RX ADMIN — Medication 975 MILLIGRAM(S): at 16:03

## 2021-11-10 RX ADMIN — CYCLOBENZAPRINE HYDROCHLORIDE 10 MILLIGRAM(S): 10 TABLET, FILM COATED ORAL at 07:38

## 2021-11-10 NOTE — PHYSICAL THERAPY INITIAL EVALUATION ADULT - IMPAIRMENTS FOUND, PT EVAL
aerobic capacity/endurance/cranial and peripheral nerve integrity/ergonomics and body mechanics/gait, locomotion, and balance/joint integrity and mobility/muscle strength/poor safety awareness/posture gait, locomotion, and balance/muscle strength

## 2021-11-10 NOTE — PHYSICAL THERAPY INITIAL EVALUATION ADULT - GENERAL OBSERVATIONS, REHAB EVAL
Patient found in pittman position with room air, +PIV, +jaramillo catheter, pneumatic pumps. Patient given oxycodone prior to arrival per WILLIAMS Esparza. Patient performed bed mobility, sit to stand transfer and gait. Patient left in semi pittman position with call bell in reach and tubes intact. Nurse Vilma aware. Patient found in pittman position with room air, +PIV, +jaramillo catheter, pneumatic pumps.

## 2021-11-10 NOTE — PHYSICAL THERAPY INITIAL EVALUATION ADULT - BALANCE TRAINING, PT EVAL
GOAL: Patient will demonstrate independent performance of ADLS c low falls risk with appropriate mobility/adaptive devices, with adherence to orthopedic precautions, in .. weeks. pt will increase sitting and standing static and dynamic balance by full grade for safety with ambulation, ADLs and transfers x6 weeks

## 2021-11-10 NOTE — PHYSICAL THERAPY INITIAL EVALUATION ADULT - MANUAL MUSCLE TESTING RESULTS, REHAB EVAL
Myotomes: grossly 3/5 on both sides from L1-S1; grossly functional against gravity to both upper limbs

## 2021-11-10 NOTE — PHYSICAL THERAPY INITIAL EVALUATION ADULT - STRENGTHENING, PT EVAL
Standing balance and strengthening exercises maintaining neutral back to improve transfers and gait. Handout provided. pt will increase BUE/LE strength by full grade for ambulation, ADLs, transfers x6 weeks

## 2021-11-10 NOTE — PHYSICAL THERAPY INITIAL EVALUATION ADULT - ADDITIONAL COMMENTS
Patient lives with  and kids in private house; two stairs to enter with bilateral rails. Independent with no AD used.

## 2021-11-10 NOTE — PHYSICAL THERAPY INITIAL EVALUATION ADULT - PLANNED THERAPY INTERVENTIONS, PT EVAL
balance training/bed mobility training/postural re-education/strengthening stairs TBA/balance training/bed mobility training/gait training/strengthening

## 2021-11-10 NOTE — PHYSICAL THERAPY INITIAL EVALUATION ADULT - BED MOBILITY TRAINING, PT EVAL
GOAL: Patient will demonstrate independent bed mobility with safe and efficient technique in .. weeks. pt will be independent with bed mobility by discharge.

## 2021-11-10 NOTE — PHYSICAL THERAPY INITIAL EVALUATION ADULT - DISCHARGE DISPOSITION, PT EVAL
Pending completion of full functional assessment. pending completion of functional assessment, stairs pending

## 2021-11-10 NOTE — PHYSICAL THERAPY INITIAL EVALUATION ADULT - CRITERIA FOR SKILLED THERAPEUTIC INTERVENTIONS
rehab potential impairments found/functional limitations in following categories/risk reduction/prevention/rehab potential/therapy frequency/predicted duration of therapy intervention impairments found/rehab potential/therapy frequency

## 2021-11-11 LAB
ANION GAP SERPL CALC-SCNC: 5 MMOL/L — SIGNIFICANT CHANGE UP (ref 5–17)
BASOPHILS # BLD AUTO: 0.02 K/UL — SIGNIFICANT CHANGE UP (ref 0–0.2)
BASOPHILS NFR BLD AUTO: 0.2 % — SIGNIFICANT CHANGE UP (ref 0–2)
BUN SERPL-MCNC: 8 MG/DL — SIGNIFICANT CHANGE UP (ref 7–23)
CALCIUM SERPL-MCNC: 7.9 MG/DL — LOW (ref 8.5–10.1)
CHLORIDE SERPL-SCNC: 105 MMOL/L — SIGNIFICANT CHANGE UP (ref 96–108)
CO2 SERPL-SCNC: 28 MMOL/L — SIGNIFICANT CHANGE UP (ref 22–31)
CREAT SERPL-MCNC: 0.46 MG/DL — LOW (ref 0.5–1.3)
EOSINOPHIL # BLD AUTO: 0.86 K/UL — HIGH (ref 0–0.5)
EOSINOPHIL NFR BLD AUTO: 9.6 % — HIGH (ref 0–6)
GLUCOSE SERPL-MCNC: 97 MG/DL — SIGNIFICANT CHANGE UP (ref 70–99)
HCT VFR BLD CALC: 31.4 % — LOW (ref 34.5–45)
HGB BLD-MCNC: 10.1 G/DL — LOW (ref 11.5–15.5)
IMM GRANULOCYTES NFR BLD AUTO: 0.3 % — SIGNIFICANT CHANGE UP (ref 0–1.5)
LYMPHOCYTES # BLD AUTO: 2.47 K/UL — SIGNIFICANT CHANGE UP (ref 1–3.3)
LYMPHOCYTES # BLD AUTO: 27.5 % — SIGNIFICANT CHANGE UP (ref 13–44)
MCHC RBC-ENTMCNC: 28.1 PG — SIGNIFICANT CHANGE UP (ref 27–34)
MCHC RBC-ENTMCNC: 32.2 GM/DL — SIGNIFICANT CHANGE UP (ref 32–36)
MCV RBC AUTO: 87.2 FL — SIGNIFICANT CHANGE UP (ref 80–100)
MONOCYTES # BLD AUTO: 0.6 K/UL — SIGNIFICANT CHANGE UP (ref 0–0.9)
MONOCYTES NFR BLD AUTO: 6.7 % — SIGNIFICANT CHANGE UP (ref 2–14)
NEUTROPHILS # BLD AUTO: 5.01 K/UL — SIGNIFICANT CHANGE UP (ref 1.8–7.4)
NEUTROPHILS NFR BLD AUTO: 55.7 % — SIGNIFICANT CHANGE UP (ref 43–77)
NRBC # BLD: 0 /100 WBCS — SIGNIFICANT CHANGE UP (ref 0–0)
PLATELET # BLD AUTO: 231 K/UL — SIGNIFICANT CHANGE UP (ref 150–400)
POTASSIUM SERPL-MCNC: 3.5 MMOL/L — SIGNIFICANT CHANGE UP (ref 3.5–5.3)
POTASSIUM SERPL-SCNC: 3.5 MMOL/L — SIGNIFICANT CHANGE UP (ref 3.5–5.3)
RBC # BLD: 3.6 M/UL — LOW (ref 3.8–5.2)
RBC # FLD: 13.2 % — SIGNIFICANT CHANGE UP (ref 10.3–14.5)
SODIUM SERPL-SCNC: 138 MMOL/L — SIGNIFICANT CHANGE UP (ref 135–145)
WBC # BLD: 8.99 K/UL — SIGNIFICANT CHANGE UP (ref 3.8–10.5)
WBC # FLD AUTO: 8.99 K/UL — SIGNIFICANT CHANGE UP (ref 3.8–10.5)

## 2021-11-11 RX ORDER — SODIUM CHLORIDE 9 MG/ML
1000 INJECTION INTRAMUSCULAR; INTRAVENOUS; SUBCUTANEOUS ONCE
Refills: 0 | Status: COMPLETED | OUTPATIENT
Start: 2021-11-11 | End: 2021-11-11

## 2021-11-11 RX ORDER — LACTULOSE 10 G/15ML
20 SOLUTION ORAL
Refills: 0 | Status: COMPLETED | OUTPATIENT
Start: 2021-11-11 | End: 2021-11-12

## 2021-11-11 RX ADMIN — Medication 1 MILLIGRAM(S): at 11:22

## 2021-11-11 RX ADMIN — Medication 975 MILLIGRAM(S): at 19:25

## 2021-11-11 RX ADMIN — OXYCODONE HYDROCHLORIDE 10 MILLIGRAM(S): 5 TABLET ORAL at 10:40

## 2021-11-11 RX ADMIN — CYCLOBENZAPRINE HYDROCHLORIDE 10 MILLIGRAM(S): 10 TABLET, FILM COATED ORAL at 05:56

## 2021-11-11 RX ADMIN — Medication 975 MILLIGRAM(S): at 18:25

## 2021-11-11 RX ADMIN — OXYCODONE HYDROCHLORIDE 10 MILLIGRAM(S): 5 TABLET ORAL at 21:08

## 2021-11-11 RX ADMIN — CYCLOBENZAPRINE HYDROCHLORIDE 10 MILLIGRAM(S): 10 TABLET, FILM COATED ORAL at 15:15

## 2021-11-11 RX ADMIN — Medication 975 MILLIGRAM(S): at 07:59

## 2021-11-11 RX ADMIN — POLYETHYLENE GLYCOL 3350 17 GRAM(S): 17 POWDER, FOR SOLUTION ORAL at 05:56

## 2021-11-11 RX ADMIN — OXYCODONE HYDROCHLORIDE 10 MILLIGRAM(S): 5 TABLET ORAL at 22:08

## 2021-11-11 RX ADMIN — Medication 975 MILLIGRAM(S): at 00:52

## 2021-11-11 RX ADMIN — Medication 975 MILLIGRAM(S): at 01:52

## 2021-11-11 RX ADMIN — Medication 500 MILLIGRAM(S): at 18:25

## 2021-11-11 RX ADMIN — Medication 25 MICROGRAM(S): at 05:56

## 2021-11-11 RX ADMIN — Medication 975 MILLIGRAM(S): at 08:50

## 2021-11-11 RX ADMIN — SODIUM CHLORIDE 500 MILLILITER(S): 9 INJECTION INTRAMUSCULAR; INTRAVENOUS; SUBCUTANEOUS at 09:30

## 2021-11-11 RX ADMIN — Medication 10 MILLIGRAM(S): at 11:54

## 2021-11-11 RX ADMIN — CYCLOBENZAPRINE HYDROCHLORIDE 10 MILLIGRAM(S): 10 TABLET, FILM COATED ORAL at 21:08

## 2021-11-11 RX ADMIN — Medication 500 MILLIGRAM(S): at 05:56

## 2021-11-11 RX ADMIN — Medication 1 TABLET(S): at 11:22

## 2021-11-11 RX ADMIN — OXYCODONE HYDROCHLORIDE 10 MILLIGRAM(S): 5 TABLET ORAL at 10:07

## 2021-11-12 LAB
ANION GAP SERPL CALC-SCNC: 9 MMOL/L — SIGNIFICANT CHANGE UP (ref 5–17)
BASOPHILS # BLD AUTO: 0.03 K/UL — SIGNIFICANT CHANGE UP (ref 0–0.2)
BASOPHILS NFR BLD AUTO: 0.3 % — SIGNIFICANT CHANGE UP (ref 0–2)
BUN SERPL-MCNC: 8 MG/DL — SIGNIFICANT CHANGE UP (ref 7–23)
CALCIUM SERPL-MCNC: 8 MG/DL — LOW (ref 8.5–10.1)
CHLORIDE SERPL-SCNC: 101 MMOL/L — SIGNIFICANT CHANGE UP (ref 96–108)
CO2 SERPL-SCNC: 24 MMOL/L — SIGNIFICANT CHANGE UP (ref 22–31)
CREAT SERPL-MCNC: 0.51 MG/DL — SIGNIFICANT CHANGE UP (ref 0.5–1.3)
EOSINOPHIL # BLD AUTO: 1.3 K/UL — HIGH (ref 0–0.5)
EOSINOPHIL NFR BLD AUTO: 12.7 % — HIGH (ref 0–6)
GLUCOSE SERPL-MCNC: 101 MG/DL — HIGH (ref 70–99)
HCT VFR BLD CALC: 32.6 % — LOW (ref 34.5–45)
HGB BLD-MCNC: 10.5 G/DL — LOW (ref 11.5–15.5)
IMM GRANULOCYTES NFR BLD AUTO: 0.4 % — SIGNIFICANT CHANGE UP (ref 0–1.5)
LYMPHOCYTES # BLD AUTO: 2.69 K/UL — SIGNIFICANT CHANGE UP (ref 1–3.3)
LYMPHOCYTES # BLD AUTO: 26.2 % — SIGNIFICANT CHANGE UP (ref 13–44)
MCHC RBC-ENTMCNC: 28 PG — SIGNIFICANT CHANGE UP (ref 27–34)
MCHC RBC-ENTMCNC: 32.2 GM/DL — SIGNIFICANT CHANGE UP (ref 32–36)
MCV RBC AUTO: 86.9 FL — SIGNIFICANT CHANGE UP (ref 80–100)
MONOCYTES # BLD AUTO: 0.66 K/UL — SIGNIFICANT CHANGE UP (ref 0–0.9)
MONOCYTES NFR BLD AUTO: 6.4 % — SIGNIFICANT CHANGE UP (ref 2–14)
NEUTROPHILS # BLD AUTO: 5.53 K/UL — SIGNIFICANT CHANGE UP (ref 1.8–7.4)
NEUTROPHILS NFR BLD AUTO: 54 % — SIGNIFICANT CHANGE UP (ref 43–77)
NRBC # BLD: 0 /100 WBCS — SIGNIFICANT CHANGE UP (ref 0–0)
PLATELET # BLD AUTO: 265 K/UL — SIGNIFICANT CHANGE UP (ref 150–400)
POTASSIUM SERPL-MCNC: 3.6 MMOL/L — SIGNIFICANT CHANGE UP (ref 3.5–5.3)
POTASSIUM SERPL-SCNC: 3.6 MMOL/L — SIGNIFICANT CHANGE UP (ref 3.5–5.3)
RBC # BLD: 3.75 M/UL — LOW (ref 3.8–5.2)
RBC # FLD: 13 % — SIGNIFICANT CHANGE UP (ref 10.3–14.5)
SODIUM SERPL-SCNC: 134 MMOL/L — LOW (ref 135–145)
WBC # BLD: 10.25 K/UL — SIGNIFICANT CHANGE UP (ref 3.8–10.5)
WBC # FLD AUTO: 10.25 K/UL — SIGNIFICANT CHANGE UP (ref 3.8–10.5)

## 2021-11-12 RX ORDER — OXYCODONE HYDROCHLORIDE 5 MG/1
2 TABLET ORAL
Qty: 50 | Refills: 0
Start: 2021-11-12 | End: 2021-11-16

## 2021-11-12 RX ORDER — POLYETHYLENE GLYCOL 3350 17 G/17G
17 POWDER, FOR SOLUTION ORAL
Qty: 0 | Refills: 0 | DISCHARGE
Start: 2021-11-12

## 2021-11-12 RX ORDER — ACETAMINOPHEN 500 MG
3 TABLET ORAL
Qty: 0 | Refills: 0 | DISCHARGE
Start: 2021-11-12

## 2021-11-12 RX ORDER — ACETAMINOPHEN 500 MG
0 TABLET ORAL
Qty: 0 | Refills: 0 | DISCHARGE

## 2021-11-12 RX ORDER — ASCORBIC ACID 60 MG
1 TABLET,CHEWABLE ORAL
Qty: 0 | Refills: 0 | DISCHARGE
Start: 2021-11-12

## 2021-11-12 RX ORDER — SENNA PLUS 8.6 MG/1
2 TABLET ORAL
Qty: 0 | Refills: 0 | DISCHARGE
Start: 2021-11-12

## 2021-11-12 RX ORDER — CYCLOBENZAPRINE HYDROCHLORIDE 10 MG/1
1 TABLET, FILM COATED ORAL
Qty: 21 | Refills: 0
Start: 2021-11-12 | End: 2021-11-18

## 2021-11-12 RX ORDER — IBUPROFEN 200 MG
0 TABLET ORAL
Qty: 0 | Refills: 0 | DISCHARGE

## 2021-11-12 RX ADMIN — OXYCODONE HYDROCHLORIDE 10 MILLIGRAM(S): 5 TABLET ORAL at 11:20

## 2021-11-12 RX ADMIN — CYCLOBENZAPRINE HYDROCHLORIDE 10 MILLIGRAM(S): 10 TABLET, FILM COATED ORAL at 13:20

## 2021-11-12 RX ADMIN — Medication 975 MILLIGRAM(S): at 06:09

## 2021-11-12 RX ADMIN — Medication 1 MILLIGRAM(S): at 13:20

## 2021-11-12 RX ADMIN — OXYCODONE HYDROCHLORIDE 10 MILLIGRAM(S): 5 TABLET ORAL at 07:09

## 2021-11-12 RX ADMIN — Medication 975 MILLIGRAM(S): at 13:20

## 2021-11-12 RX ADMIN — OXYCODONE HYDROCHLORIDE 10 MILLIGRAM(S): 5 TABLET ORAL at 06:10

## 2021-11-12 RX ADMIN — Medication 975 MILLIGRAM(S): at 07:09

## 2021-11-12 RX ADMIN — Medication 1 TABLET(S): at 13:20

## 2021-11-12 RX ADMIN — Medication 500 MILLIGRAM(S): at 17:45

## 2021-11-12 RX ADMIN — SENNA PLUS 2 TABLET(S): 8.6 TABLET ORAL at 21:30

## 2021-11-12 RX ADMIN — Medication 975 MILLIGRAM(S): at 14:20

## 2021-11-12 RX ADMIN — Medication 25 MICROGRAM(S): at 06:08

## 2021-11-12 RX ADMIN — OXYCODONE HYDROCHLORIDE 10 MILLIGRAM(S): 5 TABLET ORAL at 18:45

## 2021-11-12 RX ADMIN — OXYCODONE HYDROCHLORIDE 10 MILLIGRAM(S): 5 TABLET ORAL at 10:28

## 2021-11-12 RX ADMIN — CYCLOBENZAPRINE HYDROCHLORIDE 10 MILLIGRAM(S): 10 TABLET, FILM COATED ORAL at 06:08

## 2021-11-12 RX ADMIN — Medication 500 MILLIGRAM(S): at 06:08

## 2021-11-12 RX ADMIN — Medication 975 MILLIGRAM(S): at 22:30

## 2021-11-12 RX ADMIN — POLYETHYLENE GLYCOL 3350 17 GRAM(S): 17 POWDER, FOR SOLUTION ORAL at 17:45

## 2021-11-12 RX ADMIN — CYCLOBENZAPRINE HYDROCHLORIDE 10 MILLIGRAM(S): 10 TABLET, FILM COATED ORAL at 21:30

## 2021-11-12 RX ADMIN — OXYCODONE HYDROCHLORIDE 10 MILLIGRAM(S): 5 TABLET ORAL at 17:45

## 2021-11-12 RX ADMIN — Medication 975 MILLIGRAM(S): at 21:30

## 2021-11-12 NOTE — OCCUPATIONAL THERAPY INITIAL EVALUATION ADULT - PERTINENT HX OF CURRENT PROBLEM, REHAB EVAL
Pt is a 47 y/o female admitted for elective surgery for s/p Anterior Lumbar fusion L5--S1 due to severe pain from injury sustained  from a fall  2/2021 on ice in the parking lot of her job site. Pt stated she received multiple sessions of outpatient intervention without good outcome. Pt is a 47 y/o female admitted for elective surgery for s/p Anterior Lumbar fusion L5--S1 due to severe pain from injury sustained  from a fall  2/2021 on ice in the parking lot of her job site. Pt stated she received multiple sessions of outpatient  PT intervention without good outcome.

## 2021-11-12 NOTE — OCCUPATIONAL THERAPY INITIAL EVALUATION ADULT - LIVES WITH, PROFILE
in a private house with 2 steps to enter equipped with bilateral hand rails that cannot be reached simultaneously.  All living amenities are located on one level. The bathroom has a tub/shower combination and standard toilet with adequate space to fit a commode over it. ./children/spouse

## 2021-11-12 NOTE — OCCUPATIONAL THERAPY INITIAL EVALUATION ADULT - ADDITIONAL COMMENTS
Prior to admission, pt was functioning in her roles, self sufficient & ambulating independently without any assistive devices. Presently pt needs assistance with lower body self care tasks due to pain, weakness, stiffness and decreased ROM from s/p Anterior Lumbar fusion L5--S1. Pt is right hand dominant and wears glasses for reading.

## 2021-11-12 NOTE — OCCUPATIONAL THERAPY INITIAL EVALUATION ADULT - GENERAL OBSERVATIONS, REHAB EVAL
Pt was seen for initial OT consult, encountered OOB to chair in NAD with dressing at surgical site intact. Spinal precautions no bending ,twisting or lifting were reviewed & maintained. Pt was AA&Ox4, cooperative & followed commands. Pt c/o lumbar pain due to s/p Anterior Lumbar fusion L5--S1; this limits pt's activity tolerance ,balance, ADL management & functional mobility.

## 2021-11-12 NOTE — OCCUPATIONAL THERAPY INITIAL EVALUATION ADULT - PLANNED THERAPY INTERVENTIONS, OT EVAL
energy conservation techniques/ADL retraining/IADL retraining/balance training/bed mobility training/parent/caregiver training.../transfer training

## 2021-11-13 ENCOUNTER — TRANSCRIPTION ENCOUNTER (OUTPATIENT)
Age: 46
End: 2021-11-13

## 2021-11-13 VITALS
HEART RATE: 98 BPM | TEMPERATURE: 98 F | RESPIRATION RATE: 18 BRPM | OXYGEN SATURATION: 98 % | DIASTOLIC BLOOD PRESSURE: 64 MMHG | SYSTOLIC BLOOD PRESSURE: 112 MMHG

## 2021-11-13 RX ADMIN — CYCLOBENZAPRINE HYDROCHLORIDE 10 MILLIGRAM(S): 10 TABLET, FILM COATED ORAL at 06:18

## 2021-11-13 RX ADMIN — OXYCODONE HYDROCHLORIDE 10 MILLIGRAM(S): 5 TABLET ORAL at 11:18

## 2021-11-13 RX ADMIN — Medication 975 MILLIGRAM(S): at 06:18

## 2021-11-13 RX ADMIN — Medication 975 MILLIGRAM(S): at 07:15

## 2021-11-13 RX ADMIN — POLYETHYLENE GLYCOL 3350 17 GRAM(S): 17 POWDER, FOR SOLUTION ORAL at 06:18

## 2021-11-13 RX ADMIN — Medication 975 MILLIGRAM(S): at 13:54

## 2021-11-13 RX ADMIN — Medication 500 MILLIGRAM(S): at 06:18

## 2021-11-13 RX ADMIN — OXYCODONE HYDROCHLORIDE 10 MILLIGRAM(S): 5 TABLET ORAL at 12:15

## 2021-11-13 RX ADMIN — Medication 1 TABLET(S): at 11:18

## 2021-11-13 RX ADMIN — Medication 25 MICROGRAM(S): at 06:18

## 2021-11-13 RX ADMIN — Medication 975 MILLIGRAM(S): at 13:57

## 2021-11-13 RX ADMIN — Medication 1 MILLIGRAM(S): at 11:18

## 2021-11-13 RX ADMIN — CYCLOBENZAPRINE HYDROCHLORIDE 10 MILLIGRAM(S): 10 TABLET, FILM COATED ORAL at 13:54

## 2021-11-13 NOTE — PROGRESS NOTE ADULT - PROVIDER SPECIALTY LIST ADULT
Internal Medicine
Orthopedics
Orthopedics
Internal Medicine
Internal Medicine
Orthopedics
Vascular Surgery

## 2021-11-13 NOTE — DISCHARGE NOTE NURSING/CASE MANAGEMENT/SOCIAL WORK - NSDCFUADDAPPT_GEN_ALL_CORE_FT
Follow up with your surgeon in two weeks. Call for appointment.  If you need more pain medication, call your surgeon's office. For medication refills or authorizations, please call [phone]  We recommend that you call and schedule a follow up appointment with your primary care physician for repeat blood work (CBC and BMP) for post hospital discharge follow-up care 2-4 weeks after your surgery.   Call your surgeon if you have increased redness/pain/drainage or fever. Return to ER for shortness of breath/calf tenderness.

## 2021-11-13 NOTE — PROGRESS NOTE ADULT - SUBJECTIVE AND OBJECTIVE BOX
46yFemale s/p ALIF/PSF L5-S1 POD#2.  Pt seen and examined in NAD. Pain controlled with medications - 6/10, closer to 7-8/10 when medication wears off. Pt denies any new complaints. Pt denies CP/SOB/N/V/D/numbness/tingling/bowel or bladder dysfunction. Last BM was four days ago. +flatus. Tolerating diet.     PE:   Neuro: AAOX3  Abd: Soft. Dressing LLQ scant dry sanguinous drainage. Incision: steri strips dry, intact. No guarding. +TTP LLQ  Spine: Dressing c/d/i. Incisions; sutures C/D/I. No collections/fluctuance.   B/L UE: Skin intact. +ROM shoulder/elbow/wrist/fingers. +ok/thumbsup/fingercross signs.  strength: 5/5.  RP2+ NVI.   B/L LE: Skin intact. +ROM hip/knee/ankle/toes. Ankle Dorsi/plantarflexion: 5/5. Calf: soft, compressible and nontender. DP/PT 2+ NVI.                             10.1   8.99  )-----------( 231      ( 11 Nov 2021 06:27 )             31.4       11-11    138  |  105  |  8   ----------------------------<  97  3.5   |  28  |  0.46<L>    Ca    7.9<L>      11 Nov 2021 06:27          A/P: 46yFemale s/p ALIF/PSF L5-S1 POD#2.  Dressing changed - new dry clean dressing applied   Pain control  H&H stable   Low BP's: Fluid bolus ordered   F/U BM: Dulcolax suppository today  PT: WBAT - spinal precautions   DVT ppx: SCDs   Wound care, Isometric exercises, incentive spirometry   Medical consult appreciated  Discharge: planning for home pending BM  All the above discussed and understood by pt   
46yFemale s/p ALIF/PSF L5-S1 POD#4.   Pt seen and examined in NAD. Pain controlled.   Pt denies any new complaints.   Pt denies CP/SOB/N/V/D/numbness/tingling/bowel or bladder dysfunction.      Vital Signs Last 24 Hrs  T(C): 36.6 (13 Nov 2021 05:59), Max: 36.9 (12 Nov 2021 23:30)  T(F): 97.8 (13 Nov 2021 05:59), Max: 98.5 (12 Nov 2021 23:30)  HR: 95 (13 Nov 2021 09:15) (95 - 106)  BP: 123/67 (13 Nov 2021 09:15) (94/66 - 123/67)  BP(mean): --  RR: 18 (13 Nov 2021 09:15) (18 - 18)  SpO2: 98% (13 Nov 2021 09:15) (95% - 98%)    PE:   Neuro: AAOX3  Abd: Soft, NT/ND. Dressing C/D/I.   Spine: Dressing C/D/I. No collection or fluctuance.   B/L UE: Skin intact. +ROM shoulder/elbow/wrist/fingers. +ok/thumbsup/fingercross signs.  strength: 5/5.  RP2+ NVI.   B/L LE: Skin intact. +ROM hip/knee/ankle/toes. Ankle Dorsi/plantarflexion: 5/5. Calf: soft, compressible and nontender. DP/PT 2+ NVI.                                        10.5   10.25 )-----------( 265      ( 12 Nov 2021 06:38 )             32.6       11-12    134<L>  |  101  |  8   ----------------------------<  101<H>  3.6   |  24  |  0.51    Ca    8.0<L>      12 Nov 2021 06:38        A/P: 46yFemale s/p ALIF/PSF L5-S1 POD#4  Pain controlled  PT: WBAT - spinal precautions   DVT ppx: SCDs   Wound care, Isometric exercises, incentive spirometry   Medical consult appreciated  Discharge: planning for home today   All the above discussed and understood by pt   
46yFemale s/p ALIF/PSF L5-S1. POD #1  Pt seen and examined in NAD.   Pain controlled.  Pt denies any new complaints.   Pt denies CP/SOB/N/V/D/numbness/tingling/bowel or bladder dysfunction.   +jaramillo    ICU Vital Signs Last 24 Hrs  T(C): 36.7 (10 Nov 2021 10:04), Max: 36.9 (10 Nov 2021 04:00)  T(F): 98.1 (10 Nov 2021 10:04), Max: 98.4 (10 Nov 2021 04:00)  HR: 78 (10 Nov 2021 11:19) (68 - 106)  BP: 126/58 (10 Nov 2021 11:19) (91/53 - 131/78)  BP(mean): --  ABP: --  ABP(mean): --  RR: 18 (10 Nov 2021 11:19) (12 - 19)  SpO2: 96% (10 Nov 2021 11:19) (94% - 100%)      PE:   General: A&Ox3, NAD  Spine: Dressing c/d/i   Abd: Dressing CDI  B/L UE: Skin intact. +ROM shoulder/elbow/wrist/fingers. +ok/thumbsup/fingercross signs.  strength: 5/5.  RP2+ NVI.   B/L LE: Skin intact. +ROM hip/knee/ankle/toes. Ankle Dorsi/plantarflexion: 5/5. Calf: soft, compressible and nontender. DP/PT 2+ NVI.                                        10.8   10.22 )-----------( 259      ( 10 Nov 2021 06:37 )             33.1       11-10    136  |  104  |  7   ----------------------------<  131<H>  3.4<L>   |  28  |  0.54    Ca    8.1<L>      10 Nov 2021 06:37                    A/P: 46yFemale s/p ALIF/PSF L5/S1 POD #1  DC jaramillo when OOB and ambulating  Wound care  Pain controlled  PT: WBAT: Spinal precautions  Isometric exercises  DVT ppx: SCDs  Incentive spirometry counseled   Discharge: planning   All the above discussed and understood by pt   
ISABELLE ORONA is a 46y Female s/p ANTERIOR LUMBAR INTERBODY FUSION L5 S1 POSTERIOR SPINAL FUSI        denies any chest pain shortness of breath palpitation dizziness lightheadedness nausea vomiting fever or chills    T(C): 36.7 (11-10-21 @ 10:04), Max: 36.9 (11-10-21 @ 04:00)  HR: 78 (11-10-21 @ 11:19) (72 - 106)  BP: 126/58 (11-10-21 @ 11:19) (91/53 - 128/83)  RR: 18 (11-10-21 @ 11:19) (12 - 19)  SpO2: 96% (11-10-21 @ 11:19) (94% - 99%)  no jvd/bruit  s1 s2 rrr  cta  s/nt/nd  no calf tend                        10.8   10.22 )-----------( 259      ( 10 Nov 2021 06:37 )             33.1   11-10    136  |  104  |  7   ----------------------------<  131<H>  3.4<L>   |  28  |  0.54    Ca    8.1<L>      10 Nov 2021 06:37        cont dvt px  pain control  bowel regimen  antiemetics  incentive spirometer
ISABELLE ORONA is a 46y Female s/p ANTERIOR LUMBAR INTERBODY FUSION L5 S1 POSTERIOR SPINAL FUSI        denies any chest pain shortness of breath palpitation dizziness lightheadedness nausea vomiting fever or chills    T(C): 36.7 (11-12-21 @ 08:45), Max: 37.5 (11-11-21 @ 18:28)  HR: 106 (11-12-21 @ 08:45) (85 - 106)  BP: 108/67 (11-12-21 @ 08:45) (96/60 - 135/71)  RR: 16 (11-12-21 @ 08:45) (16 - 18)  SpO2: 96% (11-12-21 @ 08:45) (95% - 98%)  no jvd/bruit  s1 s2 rrr  cta  s/nt/nd  no calf tend                        10.5   10.25 )-----------( 265      ( 12 Nov 2021 06:38 )             32.6   11-12    134<L>  |  101  |  8   ----------------------------<  101<H>  3.6   |  24  |  0.51    Ca    8.0<L>      12 Nov 2021 06:38        cont dvt px  pain control  bowel regimen  antiemetics  incentive spirometer
46yFemale s/p ALIF/PSF L5-S1 POD#3. Pt seen and examined in NAD. Pain controlled. Pt denies any new complaints. Pt denies CP/SOB/N/V/D/numbness/tingling/bowel or bladder dysfunction. +BM yesterday.     PE:   Neuro: AAOX3  Abd: Soft, NT/ND. Dressing C/D/I.   Spine: Dressing C/D/I. No collection or fluctuance.   B/L UE: Skin intact. +ROM shoulder/elbow/wrist/fingers. +ok/thumbsup/fingercross signs.  strength: 5/5.  RP2+ NVI.   B/L LE: Skin intact. +ROM hip/knee/ankle/toes. Ankle Dorsi/plantarflexion: 5/5. Calf: soft, compressible and nontender. DP/PT 2+ NVI.                             10.5   10.25 )-----------( 265      ( 12 Nov 2021 06:38 )             32.6       11-12    134<L>  |  101  |  8   ----------------------------<  101<H>  3.6   |  24  |  0.51    Ca    8.0<L>      12 Nov 2021 06:38          A/P: 46yFemale s/p ALIF/PSF L5-S1 POD#3  Pain controlled  PT: WBAT - spinal precautions   DVT ppx: SCDs   Wound care, Isometric exercises, incentive spirometry   Medical consult appreciated  Discharge: planning for home today with home care   All the above discussed and understood by pt   
46yFemale s/p ALIF/PSF L5-S1. POD #0  Pt seen and examined in NAD.   Pain controlled.  Pt denies any new complaints.   Pt denies CP/SOB/N/V/D/numbness/tingling/bowel or bladder dysfunction.   +jaramillo    Vital Signs Last 24 Hrs  T(C): 36.8 (09 Nov 2021 19:48), Max: 37 (09 Nov 2021 07:25)  T(F): 98.3 (09 Nov 2021 19:48), Max: 98.6 (09 Nov 2021 07:25)  HR: 77 (09 Nov 2021 19:58) (68 - 93)  BP: 100/63 (09 Nov 2021 19:58) (93/62 - 131/78)  BP(mean): --  RR: 16 (09 Nov 2021 19:58) (12 - 19)  SpO2: 97% (09 Nov 2021 19:58) (95% - 100%)    PE:   General: A&Ox3, NAD  Spine: Dressing c/d/i   Abd: Dressing CDI  B/L UE: Skin intact. +ROM shoulder/elbow/wrist/fingers. +ok/thumbsup/fingercross signs.  strength: 5/5.  RP2+ NVI.   B/L LE: Skin intact. +ROM hip/knee/ankle/toes. Ankle Dorsi/plantarflexion: 5/5. Calf: soft, compressible and nontender. DP/PT 2+ NVI.                             13.2   16.99 )-----------( 268      ( 09 Nov 2021 13:38 )             41.1       11-09    133<L>  |  107  |  9   ----------------------------<  128<H>  4.1   |  21<L>  |  0.58    Ca    8.3<L>      09 Nov 2021 13:38          A/P: 46yFemale s/p ALIF/PSF L5/S1 POD #0  DC jaramillo when OOB and ambulating  Wound care  Pain controlled  PT: WBAT: Spinal precautions  Isometric exercises  DVT ppx: SCDs  Incentive spirometry counseled   Discharge: planning   All the above discussed and understood by pt   
ISABELLE ORONA is a 46y Female s/p ANTERIOR LUMBAR INTERBODY FUSION L5 S1 POSTERIOR SPINAL FUSI        denies any chest pain shortness of breath palpitation dizziness lightheadedness nausea vomiting fever or chills    T(C): 37.2 (11-11-21 @ 09:14), Max: 37.2 (11-11-21 @ 09:14)  HR: 86 (11-11-21 @ 11:10) (86 - 103)  BP: 99/46 (11-11-21 @ 11:10) (94/58 - 143/71)  RR: 18 (11-11-21 @ 11:10) (18 - 18)  SpO2: 98% (11-11-21 @ 11:10) (92% - 98%)  no jvd/bruit  s1 s2 rrr  cta  s/nt/nd  no calf tend                        10.1   8.99  )-----------( 231      ( 11 Nov 2021 06:27 )             31.4   11-11    138  |  105  |  8   ----------------------------<  97  3.5   |  28  |  0.46<L>    Ca    7.9<L>      11 Nov 2021 06:27        cont dvt px  pain control  bowel regimen  antiemetics  incentive spirometer
POD#1  c/o incisional pain.    Abdomen is soft and nondistended. Left lower abdominal dressing is clean and dry.  No leg edema or tenderness.  Pedal pulses are palpable.    Rec: Diet/ambulation/pain meds/supportive care.

## 2021-11-13 NOTE — DISCHARGE NOTE NURSING/CASE MANAGEMENT/SOCIAL WORK - PATIENT PORTAL LINK FT
You can access the FollowMyHealth Patient Portal offered by [facility] by registering at the following website: [email or web address]. By joining [email or web address]’s FollowMyHealth portal, you will also be able to view your health information using other applications (apps) compatible with our system.

## 2021-11-19 DIAGNOSIS — S33.39XA DISLOCATION OF OTHER PARTS OF LUMBAR SPINE AND PELVIS, INITIAL ENCOUNTER: ICD-10-CM
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
## 2021-11-19 DIAGNOSIS — E03.9 HYPOTHYROIDISM, UNSPECIFIED: ICD-10-CM

## 2021-11-19 DIAGNOSIS — M54.50 LOW BACK PAIN, UNSPECIFIED: ICD-10-CM

## 2021-11-19 DIAGNOSIS — M54.17 RADICULOPATHY, LUMBOSACRAL REGION: ICD-10-CM

## 2021-11-19 DIAGNOSIS — R26.0 ATAXIC GAIT: ICD-10-CM
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
## 2021-11-19 DIAGNOSIS — I95.9 HYPOTENSION, UNSPECIFIED: ICD-10-CM

## 2021-11-19 DIAGNOSIS — M43.17 SPONDYLOLISTHESIS, LUMBOSACRAL REGION: ICD-10-CM

## 2022-04-06 ENCOUNTER — APPOINTMENT (OUTPATIENT)
Dept: PAIN MANAGEMENT | Facility: CLINIC | Age: 47
End: 2022-04-06
Payer: OTHER MISCELLANEOUS

## 2022-04-06 VITALS — HEIGHT: 61 IN | WEIGHT: 143 LBS | BODY MASS INDEX: 27 KG/M2

## 2022-04-06 PROCEDURE — 99214 OFFICE O/P EST MOD 30 MIN: CPT

## 2022-04-06 PROCEDURE — 99072 ADDL SUPL MATRL&STAF TM PHE: CPT

## 2022-04-06 RX ORDER — LEVOTHYROXINE SODIUM 0.03 MG/1
25 TABLET ORAL
Refills: 0 | Status: ACTIVE | COMMUNITY

## 2022-04-06 NOTE — PHYSICAL EXAM
[Normal Coordination] : normal coordination [Normal Mood and Affect] : normal mood and affect [NL (45)] : right lateral flexion 45 degrees [5___] : left biceps 5[unfilled]/5 [4___] : right biceps 4[unfilled]/5 [] : positive facet loading

## 2022-04-06 NOTE — HISTORY OF PRESENT ILLNESS
[Neck] : neck [Sharp] : sharp [Shooting] : shooting [] : yes [Constant] : constant [Household chores] : household chores [Leisure] : leisure [Sleep] : sleep [Social interactions] : social interactions [Nothing helps with pain getting better] : Nothing helps with pain getting better [Sitting] : sitting [Walking] : walking [Bending forward] : bending forward [Lying in bed] : lying in bed [de-identified] : 4/6/22: follow up today after ANGELIQUE on 3/24/22.  Had 50% relief.  Still has pain with motion.\par \par 3/2/22: follow up today after ANGELIQUE on 2/17/22. Had 50% improvement. Would recommend another ANGELIQUE.\par \par 1/18/22: follow up today. Pain in the neck radiates to the right shoulder and down the right arm. Currently in physical therapy for the neck and lower back. Pain worse with ROM of the shoulder. \par \par MRI (8/4/21) Impression:\par 1. Straightening of the cervical lordosis, multilevel disc desiccation, and disc bulging, small posterior central\par protrusion at C4-C5 and small right paracentral protrusion at C5-C6.\par 2. No acute fracture, cord impingement or exiting nerve root impingement.\par \par 12/6/21: follow up today. since last visit had surgery with Dr. Tate. an anterior lumbar interbody fusion L5-S1, posterior spinal fusion L5-S1 on 11/9/21. Still has some pain from surgery. Continue current meds. Continue PT. Awaiting bone stim. \par \par 9/20/21- Patient did not have much relief with injection. Will see Dr. Tate on 9/29 for surgical consult.\par \par 8/3/21: follow u today after right L4/5 L5/s1 TFESI on 7/15/21. Had 50% relief. Will schedule repeat for cumulative effect. \par \par 6/9/21- Patient has not had injection. Will resubmit for TFESI. Patient states pain is worse and limits ADLs and QOL. Has a hard time walking, pain worsens with sitting for prolonged periods of time. \par \par 4/28/21: Patient presents for initial evaluation. She fell while she was at work. Her pain is in the back. Had numbness down the right leg to the foot on the day of the accident. Pain in the right leg to the knee worse when she walks. Pain keeps her up at night. sometimes the pain in the thigh gets intense and gets hard. she has to massage it to get better. She has been doing physical therapy (8 sessions total), chiropractic (9 sessions), Medrol dose pack (limited relief)\par \par She works packing in a factory. \par \par Subjective weakness:No\par Lower extremity paresthesias: Yes\par Bladder/bowel dysfunction:No\par \par Attempted modalities for current complaint:\par See above:\par \par Medications:No\par \par Injections: Right L4/5 L5/1 (7/15/21)(9/2/21)\par ANGELIQUE (2/17/22, 3/24/22)\par \par Previous Spine Surgery: N/A\par \par Imaging:\par MRI Lumbar Spine (4/5/21) - L3/4 disc bulge, L4/5 disc bulge with facet hypertrophy, grade 1 lithesis, L5/S1 herniation with abutment of the right S1 nerve root. \par \par Physician Disclaimer: I have personally reviewed and confirmed all HPI data with the patient [de-identified] : lifting, pulling

## 2022-05-04 ENCOUNTER — FORM ENCOUNTER (OUTPATIENT)
Age: 47
End: 2022-05-04

## 2022-05-09 ENCOUNTER — APPOINTMENT (OUTPATIENT)
Dept: ORTHOPEDIC SURGERY | Facility: CLINIC | Age: 47
End: 2022-05-09
Payer: OTHER MISCELLANEOUS

## 2022-05-09 PROCEDURE — 99213 OFFICE O/P EST LOW 20 MIN: CPT | Mod: ACP

## 2022-05-09 PROCEDURE — 99213 OFFICE O/P EST LOW 20 MIN: CPT

## 2022-05-09 PROCEDURE — 99072 ADDL SUPL MATRL&STAF TM PHE: CPT

## 2022-05-11 ENCOUNTER — APPOINTMENT (OUTPATIENT)
Dept: ORTHOPEDIC SURGERY | Facility: CLINIC | Age: 47
End: 2022-05-11
Payer: OTHER MISCELLANEOUS

## 2022-05-11 VITALS — WEIGHT: 143 LBS | BODY MASS INDEX: 27 KG/M2 | HEIGHT: 61 IN

## 2022-05-11 PROCEDURE — 99214 OFFICE O/P EST MOD 30 MIN: CPT

## 2022-05-11 PROCEDURE — 99072 ADDL SUPL MATRL&STAF TM PHE: CPT

## 2022-05-11 PROCEDURE — 72100 X-RAY EXAM L-S SPINE 2/3 VWS: CPT

## 2022-05-11 NOTE — HISTORY OF PRESENT ILLNESS
[Lower back] : lower back [Work related] : work related [Gradual] : gradual [6] : 6 [5] : 5 [Localized] : localized [Intermittent] : intermittent [Household chores] : household chores [Meds] : meds [Physical therapy] : physical therapy [Walking] : walking [Lying in bed] : lying in bed [] : yes [Not working due to injury] : Work status: not working due to injury [de-identified] : WC 2/3/21 - workers comp injury \par \par 9/29/21: 47 yo Tajik speaking female referred for surgical evaluation - from the back and down the right leg - some days the back is worse than the legs - activity limiting pain \par \par has tried PT and chiro\par has tried injections with Dr Miller \par has tried ibuprofen \par Not using cane \par \par MRi L spine- L5-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 \par \par xrays L spine - small spondylolisthesis L5-S1 \par \par thyroid \par No hx of cancer\par \par not working since the accident\par \par 11/24/21: Here for fu - now about 2 weeks post op - wounds dry \par \par xrays today:\par L spine- implants in good position\par \par 1/5/22: here for fu - slowly improving - pain on the right side - has bone stim - not driving yet - wounds healed \par \par xrays today:\par L spine - implants in good position\par \par 2/16/22: Here for fu - plan at last was "PT" - using cane - cant walk very far \par \par overall the pain remains \par right leg with pain\par left leg is okay \par USING BONE STIM\par \par OOW with this injury\par \par Saw WC \par \par xrays today:\par l spine- implants in good position\par \par 3/30/22: Here for f/u. Plan at last visit "handicapped parking due to limited moblity - requires a cane \par not able to return to work rec cont with PT" Taking Percocet for pain with relief.\par \par 5/11/22: Here for follow-up; ~ 6 months post op; continues to have pain to the right side of the back and buttock that radiates down the right leg at tmies. No new onset weakness. She takes the percocet for pain as needed. She has been doing the PT and ambulating with a cane. \par \par xrays today L-spine 2v - implants in good position [FreeTextEntry3] : 02/03/2021 [FreeTextEntry5] : work injury  [de-identified] : physical therapy

## 2022-05-11 NOTE — WORK
[Total] : total [Does not reveal pre-existing condition(s) that may affect treatment/prognosis] : does not reveal pre-existing condition(s) that may affect treatment/prognosis [Cane] : cane [Rx may affect patient's ability to return to work, make patient drowsy, or other issue] : Rx may affect patient's ability to return to work, make patient drowsy, or other issue. [I provided the services listed above] :  I provided the services listed above.

## 2022-05-11 NOTE — HISTORY OF PRESENT ILLNESS
[de-identified] : The patient is a 45 year old R hand dominant female who presents today complaining of neck pain.\par Date of Injury/Onset: 2/3/21\par Pain: At Rest: 8/10 \par With Activity: 9/10 \par Mechanism of injury: patient fell while at work and injured her right shoulder and right knee and neck.\par This is a Work Related Injury being treated under Worker's Compensation.\par This is not an athletic injury occurring associated with an interscholastic or organized sports team.\par Quality of symptoms: throbbing pain all day, with movement it hurts more\par Improves with: PT, ice, heat\par Worse with: Any movement of her head hurts \par Treatment/Imaging/Studies Since Last Visit: Injection w/ Dr. Miller\par Reports Available For Review Today: None\par Out of work/sport: yes, since 2/3/21\par School/Sport/Position/Occupation: [employer] \par Change since last visit: States that the injections w/ Dr. Miller has not caused any improvement. \par Additional Information: None

## 2022-05-11 NOTE — IMAGING
[de-identified] : The patient is a well appearing 46 year old female of their stated age.\par Neck is supple. Patient has trapezium tenderness. + Spurling's test to the right.\par \par Effected Shoulder: right \par Inspection:\par Scapula Winging: Negative\par Deformity: None\par Erythema: None\par Ecchymosis: None\par Abrasions: None\par Effusion: None\par Range of Motion:\par Active Forward Flexion: 170 degrees \par Active Abduction: 170 degrees \par Passive Forward Flexion: 170 degrees \par Passive Abduction: 170 degrees \par ER @ 90 degrees: 85 degrees\par IR @ 90 degrees: 30 degrees\par ER @ 0 degrees: 30 degrees\par Motor Exam:\par Forward Flexion: 4- out of 5\par Flexion Plane of Scapula: 4- out of 5\par Abduction: 4- out of 5\par Internal Rotation: 4- out of 5\par External Rotation: 4- out of 5\par Distal Motor Strength: 5 out of 5\par Stability Testing:\par Anterior: 1+\par Posterior: 1+\par Sulcus N: 1+\par Sulcus ER: 1+\par Provocative Tests:\par Drop Arm: Negative\par Impingement: +\par [place]: Negative\par X-Arm Adduction: +\par Belly Press: Negative\par Bear Hug: Negative\par Lift Off: Negative\par Apprehension: Negative\par Relocation:Negative\par Posterior Load & Shift: Negative\par Palpation:\par AC Joint: tender\par Clavicle: Nontender\par SC Joint: Nontender\par Bicepital Groove: tender\par Coracoid Process: Nontender\par Pectoralis Minor Tendon: Nontender\par Pectoralis Major Tendon: Nontender & palpably intact\par Proximal Humerus: Nontender\par Scapula Body: Nontender\par Medial Scapula Boarder: Nontender\par Scapula Spine: Nontender\par Neurologic Exam: Sensation to Light Touch:\par Axillary: Grossly intact\par Ulnar: Grossly intact\par Radial: Grossly intact\par Median: Grossly intact\par Other: N/A\par Circulatory/Pulses:\par Ulnar: 2+\par Radial: 2+\par Other Pertinent Findings: None\par Contralateral Shoulder\par Range of Motion:\par Active Forward Flexion: 180 degrees \par Active Abduction: 180 degrees \par Passive Forward Flexion: 180 degrees \par Passive Abduction: 180 degrees \par ER @ 90 degrees: 90 degrees\par IR @ 90 degrees: 45 degrees\par ER @ 0 degrees: 50 degrees\par Motor Exam:\par Forward Flexion: 5 out of 5\par Flexion Plane of Scapula: 5 out of 5\par Abduction: 5 out of 5\par Internal Rotation: 5 out of 5\par External Rotation: 5 out of 5\par Distal Motor Strength: 5 out of 5\par Stability Testing:\par Anterior: 1+\par Posterior: 1+\par Sulcus N: 1+\par Sulcus ER: 1+\par Other Pertinent Findings: None \par \par \par \par Assessment: The patient is a 45 year old female with right shoulder bursitis, right knee medial hamstring and patellar tendonitis and lumbar disc herniation pinching on the nerve root on the right side, Cervical radiculopathy, cervical disc herniation at C5-C6, right shoulder AC OA and impingement s/p work related injury ( WC DOI 2/3/21)\par \par The patient’s condition is acute and aggravated \par Documents/Results Reviewed Today: None \par Tests/Studies Independently Interpreted Today: None \par Pertinent findings include: right knee- medial joint line tenderness, + medial gopal, patella tendon tenderness, 0-95, patella apprehension\par right shoulder- 170/170/85/30/30, 4-/5 throughout, + impingement, AC joint tenderness, biceptial groove tenderness, + spurlings to the right and tenderness of right trapezius. \par Confounding medical conditions/concerns: thyroid disease \par \par Plan: Pt will continue with PT for neck, right shoulder and right knee, HEP and ice. Very little improvement after two epidural injections. Awaiting authorization for facet blocks with Dr. Miller. \par Tests Ordered: None \par Prescription Medications Ordered: none\par Braces/DME Ordered: None \par Activity/Work/Sports Status: Out of work \par Additional Instructions: activity modifications \par Follow-Up: 6 weeks\par \par The patient's current medication management of their orthopedic diagnosis was reviewed today:\par (1) We discussed a comprehensive treatment plan that included possible pharmaceutical management involving the use of prescription strength medications including but not limited to options such as oral Naprosyn 500mg BID, once daily Meloxicam 15 mg, or 500-650 mg Tylenol versus over the counter oral medications and topical prescription NSAID Pennsaid vs over the counter Voltaren gel.\par (2) There is a moderate risk of morbidity with further treatment, especially from use of prescription strength medications and possible side effects of these medications which include upset stomach with oral medications, skin reactions to topical medications and cardiac/renal issues with long term use.\par (3) I recommended that the patient follow-up with their medical physician to discuss any significant specific potential issues with long term medication use such as interactions with current medications or with exacerbation of underlying medical comorbidities.\par (4) The benefits and risks associated with use of injectable, oral or topical, prescription and over the counter anti-inflammatory medications were discussed with the patient. The patient voiced understanding of the risks including but not limited to bleeding, stroke, kidney dysfunction, heart disease, and were referred to the black box warning label for further information.\par \par I, Chuy Naranjo, attest that this documentation has been prepared under the direction and in the presence of Provider Dr. Akers \par The documentation recorded by the scribe accurately reflects the service I personally performed and the decisions made by me.\par The patient was seen by me under the direct supervision of Dr. Perez Akers

## 2022-05-11 NOTE — DISCUSSION/SUMMARY
[de-identified] : Will continue PT - discussed weaning off the percocet which she is amendable to; will send percocet #50 this month. Referrals to outside pain management clinics were given - fu 6 weeks.  A  was used.

## 2022-06-15 ENCOUNTER — APPOINTMENT (OUTPATIENT)
Dept: ORTHOPEDIC SURGERY | Facility: CLINIC | Age: 47
End: 2022-06-15
Payer: OTHER MISCELLANEOUS

## 2022-06-15 PROCEDURE — 99072 ADDL SUPL MATRL&STAF TM PHE: CPT

## 2022-06-15 PROCEDURE — 99214 OFFICE O/P EST MOD 30 MIN: CPT

## 2022-06-15 PROCEDURE — 72100 X-RAY EXAM L-S SPINE 2/3 VWS: CPT

## 2022-06-15 PROCEDURE — 72170 X-RAY EXAM OF PELVIS: CPT

## 2022-06-15 NOTE — HISTORY OF PRESENT ILLNESS
[Lower back] : lower back [Work related] : work related [Gradual] : gradual [6] : 6 [5] : 5 [Localized] : localized [Intermittent] : intermittent [Household chores] : household chores [Meds] : meds [Physical therapy] : physical therapy [Walking] : walking [Lying in bed] : lying in bed [] : yes [Not working due to injury] : Work status: not working due to injury [de-identified] : WC 2/3/21 - workers comp injury \par \par 9/29/21: 47 yo Yoruba speaking female referred for surgical evaluation - from the back and down the right leg - some days the back is worse than the legs - activity limiting pain \par \par has tried PT and chiro\par has tried injections with Dr Miller \par has tried ibuprofen \par Not using cane \par \par MRi L spine- L5-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 \par \par xrays L spine - small spondylolisthesis L5-S1 \par \par thyroid \par No hx of cancer\par \par not working since the accident\par \par 11/24/21: Here for fu - now about 2 weeks post op - wounds dry \par \par xrays today:\par L spine- implants in good position\par \par 1/5/22: here for fu - slowly improving - pain on the right side - has bone stim - not driving yet - wounds healed \par \par xrays today:\par L spine - implants in good position\par \par 2/16/22: Here for fu - plan at last was "PT" - using cane - cant walk very far \par \par overall the pain remains \par right leg with pain\par left leg is okay \par USING BONE STIM\par \par OOW with this injury\par \par Saw WC \par \par xrays today:\par l spine- implants in good position\par \par 3/30/22: Here for f/u. Plan at last visit "handicapped parking due to limited moblity - requires a cane \par not able to return to work rec cont with PT" Taking Percocet for pain with relief.\par \par 5/11/22: Here for follow-up; ~ 6 months post op; continues to have pain to the right side of the back and buttock that radiates down the right leg at tmies. No new onset weakness. She takes the percocet for pain as needed. She has been doing the PT and ambulating with a cane. \par \par xrays today L-spine 2v - implants in good position\par \par 6/15/22: Here for f/u. Last visit plan "Will continue PT - discussed weaning off the percocet which she is amendable to; will send percocet #50 this month. Referrals to outside pain management clinics were given - fu 6 weeks.  A  was used."\par Overall feels symptoms are worse. Continues PT and medication. right leg shooting pain - left leg is okay - down the side of the right thigh - all the way down - now about 7 months post op - cant stand up for long \par \par PT shes doing \par \par xrays today:\par l spine - implants in good posotoin\par AP PELVIS - negative  [FreeTextEntry3] : 02/03/2021 [FreeTextEntry5] : work injury  [de-identified] : physical therapy

## 2022-06-19 ENCOUNTER — FORM ENCOUNTER (OUTPATIENT)
Age: 47
End: 2022-06-19

## 2022-06-20 ENCOUNTER — APPOINTMENT (OUTPATIENT)
Dept: ORTHOPEDIC SURGERY | Facility: CLINIC | Age: 47
End: 2022-06-20
Payer: OTHER MISCELLANEOUS

## 2022-06-20 VITALS — BODY MASS INDEX: 27 KG/M2 | HEIGHT: 61 IN | WEIGHT: 143 LBS

## 2022-06-20 PROCEDURE — 99213 OFFICE O/P EST LOW 20 MIN: CPT

## 2022-06-20 PROCEDURE — 99072 ADDL SUPL MATRL&STAF TM PHE: CPT | Mod: ACP

## 2022-06-20 PROCEDURE — 99072 ADDL SUPL MATRL&STAF TM PHE: CPT

## 2022-06-20 PROCEDURE — 99213 OFFICE O/P EST LOW 20 MIN: CPT | Mod: ACP

## 2022-06-21 ENCOUNTER — FORM ENCOUNTER (OUTPATIENT)
Age: 47
End: 2022-06-21

## 2022-06-21 NOTE — IMAGING
[de-identified] : The patient is a well appearing 46 year old female of their stated age.\par Neck is supple. Patient has trapezium tenderness. + Spurling's test to the right.\par \par Effected Shoulder: right \par Inspection:\par Scapula Winging: Negative\par Deformity: None\par Erythema: None\par Ecchymosis: None\par Abrasions: None\par Effusion: None\par Range of Motion:\par Active Forward Flexion: 170 degrees \par Active Abduction: 170 degrees \par Passive Forward Flexion: 170 degrees \par Passive Abduction: 170 degrees \par ER @ 90 degrees: 85 degrees\par IR @ 90 degrees: 30 degrees\par ER @ 0 degrees: 30 degrees\par Motor Exam:\par Forward Flexion: 4- out of 5\par Flexion Plane of Scapula: 4- out of 5\par Abduction: 4- out of 5\par Internal Rotation: 4- out of 5\par External Rotation: 4- out of 5\par Distal Motor Strength: 5 out of 5\par Stability Testing:\par Anterior: 1+\par Posterior: 1+\par Sulcus N: 1+\par Sulcus ER: 1+\par Provocative Tests:\par Drop Arm: Negative\par Impingement: +\par [place]: Negative\par X-Arm Adduction: +\par Belly Press: Negative\par Bear Hug: Negative\par Lift Off: Negative\par Apprehension: Negative\par Relocation:Negative\par Posterior Load & Shift: Negative\par Palpation:\par AC Joint: tender\par Clavicle: Nontender\par SC Joint: Nontender\par Bicepital Groove: tender\par Coracoid Process: Nontender\par Pectoralis Minor Tendon: Nontender\par Pectoralis Major Tendon: Nontender & palpably intact\par Proximal Humerus: Nontender\par Scapula Body: Nontender\par Medial Scapula Boarder: Nontender\par Scapula Spine: Nontender\par Neurologic Exam: Sensation to Light Touch:\par Axillary: Grossly intact\par Ulnar: Grossly intact\par Radial: Grossly intact\par Median: Grossly intact\par Other: N/A\par Circulatory/Pulses:\par Ulnar: 2+\par Radial: 2+\par Other Pertinent Findings: None\par Contralateral Shoulder\par Range of Motion:\par Active Forward Flexion: 180 degrees \par Active Abduction: 180 degrees \par Passive Forward Flexion: 180 degrees \par Passive Abduction: 180 degrees \par ER @ 90 degrees: 90 degrees\par IR @ 90 degrees: 45 degrees\par ER @ 0 degrees: 50 degrees\par Motor Exam:\par Forward Flexion: 5 out of 5\par Flexion Plane of Scapula: 5 out of 5\par Abduction: 5 out of 5\par Internal Rotation: 5 out of 5\par External Rotation: 5 out of 5\par Distal Motor Strength: 5 out of 5\par Stability Testing:\par Anterior: 1+\par Posterior: 1+\par Sulcus N: 1+\par Sulcus ER: 1+\par Other Pertinent Findings: None \par \par \par \par Assessment: The patient is a 45 year old female with right shoulder bursitis, right knee medial hamstring and patellar tendonitis and lumbar disc herniation pinching on the nerve root on the right side, Cervical radiculopathy, cervical disc herniation at C5-C6, right shoulder AC OA and impingement s/p work related injury ( WC DOI 2/3/21)\par \par The patient’s condition is acute and aggravated \par Documents/Results Reviewed Today: None \par Tests/Studies Independently Interpreted Today: None \par Pertinent findings include: right knee- medial joint line tenderness, + medial gopal, patella tendon tenderness, 0-95, patella apprehension\par right shoulder- 170/170/85/30/30, 4-/5 throughout, + impingement, AC joint tenderness, biceptial groove tenderness, + spurlings to the right and tenderness of right trapezius. \par Confounding medical conditions/concerns: thyroid disease \par \par Plan: Pt still has not yet heard back from Dr. Miller team in reference to facet injections. \par Pt is referred back to Dr. Miller, will f/up in 8 weeks for re-eval. \par Tests Ordered: None \par Prescription Medications Ordered: none\par Braces/DME Ordered: None \par Activity/Work/Sports Status: Out of work \par Additional Instructions: activity modifications \par Follow-Up: 8 weeks\par \par The patient's current medication management of their orthopedic diagnosis was reviewed today:\par (1) We discussed a comprehensive treatment plan that included possible pharmaceutical management involving the use of prescription strength medications including but not limited to options such as oral Naprosyn 500mg BID, once daily Meloxicam 15 mg, or 500-650 mg Tylenol versus over the counter oral medications and topical prescription NSAID Pennsaid vs over the counter Voltaren gel.\par (2) There is a moderate risk of morbidity with further treatment, especially from use of prescription strength medications and possible side effects of these medications which include upset stomach with oral medications, skin reactions to topical medications and cardiac/renal issues with long term use.\par (3) I recommended that the patient follow-up with their medical physician to discuss any significant specific potential issues with long term medication use such as interactions with current medications or with exacerbation of underlying medical comorbidities.\par (4) The benefits and risks associated with use of injectable, oral or topical, prescription and over the counter anti-inflammatory medications were discussed with the patient. The patient voiced understanding of the risks including but not limited to bleeding, stroke, kidney dysfunction, heart disease, and were referred to the black box warning label for further information.\par \par The documentation recorded by the scribe accurately reflects the service I personally performed and the decisions made by me.\par The patient was seen by me under the direct supervision of Dr. Perez Bruner\par \par

## 2022-06-21 NOTE — HISTORY OF PRESENT ILLNESS
[de-identified] : The patient is a 45 year old R hand dominant female who presents today complaining of neck pain.\par Date of Injury/Onset: 2/3/21\par Pain: At Rest: 8/10 \par With Activity: 9/10 \par Mechanism of injury: patient fell while at work and injured her right shoulder and right knee and neck.\par This is a Work Related Injury being treated under Worker's Compensation.\par This is not an athletic injury occurring associated with an interscholastic or organized sports team.\par Quality of symptoms: throbbing pain all day, with movement it hurts more\par Improves with: PT, ice, heat\par Worse with: Any movement of her head hurts \par Treatment/Imaging/Studies Since Last Visit: PT\par Reports Available For Review Today: None\par Out of work/sport: out of work since 2/3/21\par School/Sport/Position/Occupation: warehouse \par Change since last visit: not feeling any better since last visit\par Additional Information: None no

## 2022-06-22 ENCOUNTER — APPOINTMENT (OUTPATIENT)
Dept: MRI IMAGING | Facility: CLINIC | Age: 47
End: 2022-06-22
Payer: OTHER MISCELLANEOUS

## 2022-06-22 PROCEDURE — 72148 MRI LUMBAR SPINE W/O DYE: CPT

## 2022-06-22 PROCEDURE — 99072 ADDL SUPL MATRL&STAF TM PHE: CPT

## 2022-06-27 ENCOUNTER — FORM ENCOUNTER (OUTPATIENT)
Age: 47
End: 2022-06-27

## 2022-07-11 ENCOUNTER — APPOINTMENT (OUTPATIENT)
Dept: PAIN MANAGEMENT | Facility: CLINIC | Age: 47
End: 2022-07-11

## 2022-07-11 VITALS — WEIGHT: 143 LBS | HEIGHT: 61 IN | BODY MASS INDEX: 27 KG/M2

## 2022-07-11 PROCEDURE — 99072 ADDL SUPL MATRL&STAF TM PHE: CPT

## 2022-07-11 PROCEDURE — 99214 OFFICE O/P EST MOD 30 MIN: CPT

## 2022-07-13 ENCOUNTER — APPOINTMENT (OUTPATIENT)
Dept: ORTHOPEDIC SURGERY | Facility: CLINIC | Age: 47
End: 2022-07-13
Payer: OTHER MISCELLANEOUS

## 2022-07-13 PROCEDURE — 99214 OFFICE O/P EST MOD 30 MIN: CPT

## 2022-07-13 PROCEDURE — 99072 ADDL SUPL MATRL&STAF TM PHE: CPT

## 2022-07-13 RX ORDER — OXYCODONE AND ACETAMINOPHEN 5; 325 MG/1; MG/1
5-325 TABLET ORAL
Qty: 40 | Refills: 0 | Status: ACTIVE | COMMUNITY
Start: 2022-05-11 | End: 1900-01-01

## 2022-07-13 NOTE — DISCUSSION/SUMMARY
[de-identified] : reviewed the MRi with her - questions answered - no indication for revision surgery - the surgery looks good unfortunately the pain remains and her activity is very limited \par fu 3 months \par consideration for SCS \par not able to rtw

## 2022-07-13 NOTE — HISTORY OF PRESENT ILLNESS
[Lower back] : lower back [Gradual] : gradual [Sudden] : sudden [Burning] : burning [Shooting] : shooting [Stabbing] : stabbing [Work related] : work related [6] : 6 [5] : 5 [Localized] : localized [Intermittent] : intermittent [Household chores] : household chores [Meds] : meds [Physical therapy] : physical therapy [Walking] : walking [Lying in bed] : lying in bed [Not working due to injury] : Work status: not working due to injury [de-identified] : WC 2/3/21 - workers comp injury \par \par 9/29/21: 47 yo Lithuanian speaking female referred for surgical evaluation - from the back and down the right leg - some days the back is worse than the legs - activity limiting pain \par \par has tried PT and chiro\par has tried injections with Dr Miller \par has tried ibuprofen \par Not using cane \par \par MRi L spine- L5-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 \par \par xrays L spine - small spondylolisthesis L5-S1 \par \par thyroid \par No hx of cancer\par \par not working since the accident\par \par 11/24/21: Here for fu - now about 2 weeks post op - wounds dry \par \par xrays today:\par L spine- implants in good position\par \par 1/5/22: here for fu - slowly improving - pain on the right side - has bone stim - not driving yet - wounds healed \par \par xrays today:\par L spine - implants in good position\par \par 2/16/22: Here for fu - plan at last was "PT" - using cane - cant walk very far \par \par overall the pain remains \par right leg with pain\par left leg is okay \par USING BONE STIM\par \par OOW with this injury\par \par Saw WC \par \par xrays today:\par l spine- implants in good position\par \par 3/30/22: Here for f/u. Plan at last visit "handicapped parking due to limited moblity - requires a cane \par not able to return to work rec cont with PT" Taking Percocet for pain with relief.\par \par 5/11/22: Here for follow-up; ~ 6 months post op; continues to have pain to the right side of the back and buttock that radiates down the right leg at tmies. No new onset weakness. She takes the percocet for pain as needed. She has been doing the PT and ambulating with a cane. \par \par xrays today L-spine 2v - implants in good position\par \par 6/15/22: Here for f/u. Last visit plan "Will continue PT - discussed weaning off the percocet which she is amendable to; will send percocet #50 this month. Referrals to outside pain management clinics were given - fu 6 weeks.  A  was used."\par Overall feels symptoms are worse. Continues PT and medication. right leg shooting pain - left leg is okay - down the side of the right thigh - all the way down - now about 7 months post op - cant stand up for long \par \par PT shes doing \par \par xrays today:\par l spine - implants in good posotoin\par AP PELVIS - negative \par \par 7/13/22: Here for MRI review.- pain in the lumbar spine has been severe - using cane - pain in the back and down the leg all the way down \par \par MRI L-spine:\par -Postoperative study following L5-S1 discectomy and lumbosacral fusion without thecal sac or nerve root compression at the operated level.\par -Mild degenerative disc disease at L3-4.\par -Small left-sided disc herniation at L4-5 without stenosis or nerve root compression. [] : Post Surgical Visit: no [FreeTextEntry1] : L spine  [FreeTextEntry3] : 02/03/2021 [FreeTextEntry5] : work injury  [de-identified] : xrays mris  [de-identified] : physical therapy

## 2022-07-27 ENCOUNTER — APPOINTMENT (OUTPATIENT)
Dept: PAIN MANAGEMENT | Facility: CLINIC | Age: 47
End: 2022-07-27
Payer: OTHER MISCELLANEOUS

## 2022-07-27 PROCEDURE — 99214 OFFICE O/P EST MOD 30 MIN: CPT

## 2022-07-27 PROCEDURE — 99072 ADDL SUPL MATRL&STAF TM PHE: CPT | Mod: ACP

## 2022-07-27 PROCEDURE — 99072 ADDL SUPL MATRL&STAF TM PHE: CPT

## 2022-07-27 PROCEDURE — 99214 OFFICE O/P EST MOD 30 MIN: CPT | Mod: ACP

## 2022-07-27 NOTE — PHYSICAL EXAM
[Normal Coordination] : normal coordination [Normal Mood and Affect] : normal mood and affect [NL (45)] : right lateral flexion 45 degrees [5___] : left biceps 5[unfilled]/5 [4___] : right hamstring 4[unfilled]/5 [Right lower extremity below knee] : right lower extremity below knee [Right lower extremity above knee] : right lower extremity above knee [] : no ecchymosis [FreeTextEntry8] : right worse than left

## 2022-07-27 NOTE — HISTORY OF PRESENT ILLNESS
[8] : 8 [7] : 7 [Sharp] : sharp [Shooting] : shooting [Constant] : constant [Household chores] : household chores [Leisure] : leisure [Sleep] : sleep [Social interactions] : social interactions [Nothing helps with pain getting better] : Nothing helps with pain getting better [Sitting] : sitting [Walking] : walking [Bending forward] : bending forward [Lying in bed] : lying in bed [Lower back] : lower back [] : yes [FreeTextEntry1] : 07/27/2022: follow up today.  Still has pain in low back after L5-S1 fusion.  Pain is in low back and into right buttocks.   It was recommended by Dr. Tate she trial a SCS for her continued LE pain. Will get a psych trial prior. \par \par 7/11/22: still with R sided neck pain , will request MBB again.\par \par 4/6/22: follow up today after ANGELIQUE on 3/24/22. Had 50% relief. Still has pain with motion.\par \par 3/2/22: follow up today after ANGELIQUE on 2/17/22. Had 50% improvement. Would recommend another ANGELIQUE.\par \par 1/18/22: follow up today. Pain in the neck radiates to the right shoulder and down the right arm. Currently in physical therapy for the neck and lower back. Pain worse with ROM of the shoulder. \par \par MRI (8/4/21) Impression:\par 1. Straightening of the cervical lordosis, multilevel disc desiccation, and disc bulging, small posterior central\par protrusion at C4-C5 and small right paracentral protrusion at C5-C6.\par 2. No acute fracture, cord impingement or exiting nerve root impingement.\par \par 12/6/21: follow up today. since last visit had surgery with Dr. Tate. an anterior lumbar interbody fusion L5-S1,\par posterior spinal fusion L5-S1 on 11/9/21. Still has some pain from surgery. Continue current meds. Continue PT. Awaiting bone stim. \par \par 9/20/21- Patient did not have much relief with injection. Will see Dr. Tate on 9/29 for surgical consult.\par \par 8/3/21: follow u today after right L4/5 L5/s1 TFESI on 7/15/21. Had 50% relief. Will schedule repeat for cumulative\par effect. \par \par 6/9/21- Patient has not had injection. Will resubmit for TFESI. Patient states pain is worse and limits ADLs and QOL. Has a hard time walking, pain worsens with sitting for prolonged periods of time. \par \par 4/28/21: Patient presents for initial evaluation. She fell while she was at work. Her pain is in the back. Had numbness down the right leg to the foot on the day of the accident. Pain in the right leg to the knee worse when she walks. Pain keeps her up at night. sometimes the pain in the thigh gets intense and gets hard. she has to massage it to get better. She has been doing physical therapy (8 sessions total), chiropractic (9 sessions), Medrol dose pack (limited relief) She works packing in a factory. \par \par Subjective weakness:No\par Lower extremity paresthesias: Yes\par Bladder/bowel dysfunction:No\par Attempted modalities for current complaint:\par See above:\par Medications:No\par Injections: Right L4/5 L5/1 (7/15/21)(9/2/21)\par ANGELIQUE (2/17/22) \par Previous Spine Surgery: N/A\par \par Imaging:\par MRI Lumbar Spine (4/5/21) - L3/4 disc bulge, L4/5 disc bulge with facet hypertrophy, grade 1 lithesis, L5/S1 herniation with abutment of the right S1 nerve root.  [FreeTextEntry3] : 02/03/2021 [de-identified] : lifting, [de-identified] : 4/6/22: follow up today after ANGELIQUE on 3/24/22.  Had 50% relief.  Still has pain with motion.\par \par 3/2/22: follow up today after ANGELIQUE on 2/17/22. Had 50% improvement. Would recommend another ANGELIQUE.\par \par 1/18/22: follow up today. Pain in the neck radiates to the right shoulder and down the right arm. Currently in physical therapy for the neck and lower back. Pain worse with ROM of the shoulder. \par \par MRI (8/4/21) Impression:\par 1. Straightening of the cervical lordosis, multilevel disc desiccation, and disc bulging, small posterior central\par protrusion at C4-C5 and small right paracentral protrusion at C5-C6.\par 2. No acute fracture, cord impingement or exiting nerve root impingement.\par \par 12/6/21: follow up today. since last visit had surgery with Dr. Tate. an anterior lumbar interbody fusion L5-S1, posterior spinal fusion L5-S1 on 11/9/21. Still has some pain from surgery. Continue current meds. Continue PT. Awaiting bone stim. \par \par 9/20/21- Patient did not have much relief with injection. Will see Dr. Tate on 9/29 for surgical consult.\par \par 8/3/21: follow u today after right L4/5 L5/s1 TFESI on 7/15/21. Had 50% relief. Will schedule repeat for cumulative effect. \par \par 6/9/21- Patient has not had injection. Will resubmit for TFESI. Patient states pain is worse and limits ADLs and QOL. Has a hard time walking, pain worsens with sitting for prolonged periods of time. \par \par 4/28/21: Patient presents for initial evaluation. She fell while she was at work. Her pain is in the back. Had numbness down the right leg to the foot on the day of the accident. Pain in the right leg to the knee worse when she walks. Pain keeps her up at night. sometimes the pain in the thigh gets intense and gets hard. she has to massage it to get better. She has been doing physical therapy (8 sessions total), chiropractic (9 sessions), Medrol dose pack (limited relief)\par \par She works packing in a factory. \par \par Subjective weakness:No\par Lower extremity paresthesias: Yes\par Bladder/bowel dysfunction:No\par \par Attempted modalities for current complaint:\par See above:\par \par Medications:No\par \par Injections: Right L4/5 L5/1 (7/15/21)(9/2/21)\par ANGELIQUE (2/17/22, 3/24/22)\par \par Previous Spine Surgery: N/A\par \par Imaging:\par MRI Lumbar Spine (4/5/21) - L3/4 disc bulge, L4/5 disc bulge with facet hypertrophy, grade 1 lithesis, L5/S1 herniation with abutment of the right S1 nerve root. \par \par Physician Disclaimer: I have personally reviewed and confirmed all HPI data with the patient

## 2022-08-25 ENCOUNTER — APPOINTMENT (OUTPATIENT)
Age: 47
End: 2022-08-25

## 2022-08-25 PROCEDURE — 64492 INJ PARAVERT F JNT C/T 3 LEV: CPT | Mod: 59,RT

## 2022-08-25 PROCEDURE — 64490 INJ PARAVERT F JNT C/T 1 LEV: CPT | Mod: RT

## 2022-08-25 PROCEDURE — 64491 INJ PARAVERT F JNT C/T 2 LEV: CPT | Mod: 59,RT

## 2022-08-29 ENCOUNTER — APPOINTMENT (OUTPATIENT)
Dept: ORTHOPEDIC SURGERY | Facility: CLINIC | Age: 47
End: 2022-08-29

## 2022-08-29 VITALS — HEIGHT: 61 IN | WEIGHT: 143 LBS | BODY MASS INDEX: 27 KG/M2

## 2022-08-29 LAB — SARS-COV-2 N GENE NPH QL NAA+PROBE: NOT DETECTED

## 2022-08-29 PROCEDURE — 99213 OFFICE O/P EST LOW 20 MIN: CPT

## 2022-08-29 PROCEDURE — 99072 ADDL SUPL MATRL&STAF TM PHE: CPT

## 2022-08-29 NOTE — WORK
[Sprain/Strain] : sprain/strain [Was the competent medical cause of the injury] : was the competent medical cause of the injury [Are consistent with the injury] : are consistent with the injury [Consistent with my objective findings] : consistent with my objective findings [Total] : total [Does not reveal pre-existing condition(s) that may affect treatment/prognosis] : does not reveal pre-existing condition(s) that may affect treatment/prognosis [No Rx restrictions] : No Rx restrictions. [I provided the services listed above] :  I provided the services listed above.

## 2022-08-30 NOTE — HISTORY OF PRESENT ILLNESS
[de-identified] : The patient is a 45 year old R hand dominant female who presents today complaining of neck pain.\par Date of Injury/Onset: 2/3/21\par Pain: At Rest: 6/10 \par With Activity: 7/10 \par Mechanism of injury: patient fell while at work and injured her right shoulder and right knee and neck.\par This is a Work Related Injury being treated under Worker's Compensation.\par This is not an athletic injury occurring associated with an interscholastic or organized sports team.\par Quality of symptoms: throbbing pain all day, with movement it hurts more\par Improves with: PT, ice, heat\par Worse with: Any movement of her head hurts \par Treatment/Imaging/Studies Since Last Visit: PT, Injection with Dr. Miller\par Reports Available For Review Today: None\par Out of work/sport: out of work since 2/3/21\par School/Sport/Position/Occupation: [employer] \par Change since last visit: feeling a little better since last visit. had an injection with Dr. Miller and has felt some relief from it\par Additional Information: None

## 2022-08-30 NOTE — IMAGING
[de-identified] : The patient is a well appearing 47 year old female of their stated age.\par Neck is supple. Patient has trapezium tenderness. + Spurling's test to the right.\par \par Effected Shoulder: right \par Inspection:\par Scapula Winging: Negative\par Deformity: None\par Erythema: None\par Ecchymosis: None\par Abrasions: None\par Effusion: None\par Range of Motion:\par Active Forward Flexion: 170 degrees \par Active Abduction: 170 degrees \par Passive Forward Flexion: 170 degrees \par Passive Abduction: 170 degrees \par ER @ 90 degrees: 85 degrees\par IR @ 90 degrees: 30 degrees\par ER @ 0 degrees: 30 degrees\par Motor Exam:\par Forward Flexion: 4 out of 5\par Flexion Plane of Scapula: 4 out of 5\par Abduction: 4 out of 5\par Internal Rotation: 5 out of 5\par External Rotation: 5 out of 5\par Distal Motor Strength: 5 out of 5\par Stability Testing:\par Anterior: 1+\par Posterior: 1+\par Sulcus N: 1+\par Sulcus ER: 1+\par Provocative Tests:\par Drop Arm: Negative\par Impingement: +\par [place]: Negative\par X-Arm Adduction: +\par Belly Press: Negative\par Bear Hug: Negative\par Lift Off: Negative\par Apprehension: Negative\par Relocation:Negative\par Posterior Load & Shift: Negative\par Palpation:\par AC Joint: MILD TENDERNESS \par Clavicle: Nontender\par SC Joint: Nontender\par Bicepital Groove: tender\par Coracoid Process: Nontender\par Pectoralis Minor Tendon: Nontender\par Pectoralis Major Tendon: Nontender & palpably intact\par Proximal Humerus: Nontender\par Scapula Body: Nontender\par Medial Scapula Boarder: Nontender\par Scapula Spine: Nontender\par Neurologic Exam: Sensation to Light Touch:\par Axillary: Grossly intact\par Ulnar: Grossly intact\par Radial: Grossly intact\par Median: Grossly intact\par Other: N/A\par Circulatory/Pulses:\par Ulnar: 2+\par Radial: 2+\par Other Pertinent Findings: None\par Contralateral Shoulder\par Range of Motion:\par Active Forward Flexion: 180 degrees \par Active Abduction: 180 degrees \par Passive Forward Flexion: 180 degrees \par Passive Abduction: 180 degrees \par ER @ 90 degrees: 90 degrees\par IR @ 90 degrees: 45 degrees\par ER @ 0 degrees: 50 degrees\par Motor Exam:\par Forward Flexion: 5 out of 5\par Flexion Plane of Scapula: 5 out of 5\par Abduction: 5 out of 5\par Internal Rotation: 5 out of 5\par External Rotation: 5 out of 5\par Distal Motor Strength: 5 out of 5\par Stability Testing:\par Anterior: 1+\par Posterior: 1+\par Sulcus N: 1+\par Sulcus ER: 1+\par Other Pertinent Findings: None \par \par \par \par Assessment: The patient is a 47 year old female with right shoulder bursitis, right knee medial hamstring and patellar tendonitis and lumbar disc herniation pinching on the nerve root on the right side, Cervical radiculopathy, cervical disc herniation at C5-C6, right shoulder AC OA and impingement s/p work related injury ( WC DOI 2/3/21)\par \par The patient’s condition is acute and aggravated \par Documents/Results Reviewed Today: None \par Tests/Studies Independently Interpreted Today: None \par Pertinent findings include: right knee- medial joint line tenderness, + medial gopal, patella tendon tenderness, 0-95, patella apprehension\par right shoulder- 170/170/85/30/30, 4/5 ABD FF FSP, 5/5 IR ER, + impingement, AC joint mild tenderness, biceptial groove tenderness, + spurlings to the right and tenderness of right trapezius. \par Confounding medical conditions/concerns: thyroid disease \par \par Plan: Patient will c/t with PT and HEP. \par Tests Ordered: None \par Prescription Medications Ordered: none\par Braces/DME Ordered: None \par Activity/Work/Sports Status: Out of work \par Additional Instructions: activity modifications \par Follow-Up: 8 weeks\par \par The patient's current medication management of their orthopedic diagnosis was reviewed today:\par (1) We discussed a comprehensive treatment plan that included possible pharmaceutical management involving the use of prescription strength medications including but not limited to options such as oral Naprosyn 500mg BID, once daily Meloxicam 15 mg, or 500-650 mg Tylenol versus over the counter oral medications and topical prescription NSAID Pennsaid vs over the counter Voltaren gel.\par (2) There is a moderate risk of morbidity with further treatment, especially from use of prescription strength medications and possible side effects of these medications which include upset stomach with oral medications, skin reactions to topical medications and cardiac/renal issues with long term use.\par (3) I recommended that the patient follow-up with their medical physician to discuss any significant specific potential issues with long term medication use such as interactions with current medications or with exacerbation of underlying medical comorbidities.\par (4) The benefits and risks associated with use of injectable, oral or topical, prescription and over the counter anti-inflammatory medications were discussed with the patient. The patient voiced understanding of the risks including but not limited to bleeding, stroke, kidney dysfunction, heart disease, and were referred to the black box warning label for further information.\par \par \par I, Chuy Naranjo, attest that this documentation has been prepared under the direction and in the presence of Provider Dr. Bruner\par \par The documentation recorded by the scribe accurately reflects the service I personally performed and the decisions made by me.\par

## 2022-09-14 ENCOUNTER — APPOINTMENT (OUTPATIENT)
Dept: PAIN MANAGEMENT | Facility: CLINIC | Age: 47
End: 2022-09-14
Payer: OTHER MISCELLANEOUS

## 2022-09-14 VITALS — WEIGHT: 143 LBS | HEIGHT: 61 IN | BODY MASS INDEX: 27 KG/M2

## 2022-09-14 PROCEDURE — 99072 ADDL SUPL MATRL&STAF TM PHE: CPT

## 2022-09-14 PROCEDURE — 99214 OFFICE O/P EST MOD 30 MIN: CPT | Mod: ACP

## 2022-09-14 PROCEDURE — 99214 OFFICE O/P EST MOD 30 MIN: CPT

## 2022-09-14 PROCEDURE — 99072 ADDL SUPL MATRL&STAF TM PHE: CPT | Mod: ACP

## 2022-09-14 NOTE — HISTORY OF PRESENT ILLNESS
[Sharp] : sharp [Constant] : constant [Household chores] : household chores [Leisure] : leisure [Sleep] : sleep [Social interactions] : social interactions [Sitting] : sitting [Walking] : walking [Bending forward] : bending forward [Lying in bed] : lying in bed [Neck] : neck [7] : 7 [6] : 6 [Dull/Aching] : dull/aching [Radiating] : radiating [Throbbing] : throbbing [Rest] : rest [Meds] : meds [Massage] : massage [Injection therapy] : injection therapy [Not working due to injury] : Work status: not working due to injury [FreeTextEntry1] : 09/14/2022: (15 minutes late)  follow up today.  had 80% relief from lower cervical MBB #1.\par \par 07/27/2022: follow up today.  Still has pain in low back after L5-S1 fusion.  Pain is in low back and into right buttocks.   It was recommended by Dr. Tate she trial a SCS for her continued LE pain. Will get a psych trial prior. \par \par 7/11/22: still with R sided neck pain , will request MBB again.\par \par 4/6/22: follow up today after ANGELIQUE on 3/24/22. Had 50% relief. Still has pain with motion.\par \par 3/2/22: follow up today after ANGELIQUE on 2/17/22. Had 50% improvement. Would recommend another ANGELIQUE.\par \par 1/18/22: follow up today. Pain in the neck radiates to the right shoulder and down the right arm. Currently in physical therapy for the neck and lower back. Pain worse with ROM of the shoulder. \par \par MRI (8/4/21) Impression:\par 1. Straightening of the cervical lordosis, multilevel disc desiccation, and disc bulging, small posterior central\par protrusion at C4-C5 and small right paracentral protrusion at C5-C6.\par 2. No acute fracture, cord impingement or exiting nerve root impingement.\par \par 12/6/21: follow up today. since last visit had surgery with Dr. Tate. an anterior lumbar interbody fusion L5-S1,\par posterior spinal fusion L5-S1 on 11/9/21. Still has some pain from surgery. Continue current meds. Continue PT. Awaiting bone stim. \par \par 9/20/21- Patient did not have much relief with injection. Will see Dr. Tate on 9/29 for surgical consult.\par \par 8/3/21: follow u today after right L4/5 L5/s1 TFESI on 7/15/21. Had 50% relief. Will schedule repeat for cumulative\par effect. \par \par 6/9/21- Patient has not had injection. Will resubmit for TFESI. Patient states pain is worse and limits ADLs and QOL. Has a hard time walking, pain worsens with sitting for prolonged periods of time. \par \par 4/28/21: Patient presents for initial evaluation. She fell while she was at work. Her pain is in the back. Had numbness down the right leg to the foot on the day of the accident. Pain in the right leg to the knee worse when she walks. Pain keeps her up at night. sometimes the pain in the thigh gets intense and gets hard. she has to massage it to get better. She has been doing physical therapy (8 sessions total), chiropractic (9 sessions), Medrol dose pack (limited relief) She works packing in a factory. \par \par Subjective weakness:No\par Lower extremity paresthesias: Yes\par Bladder/bowel dysfunction:No\par Attempted modalities for current complaint:\par See above:\par Medications:No\par Injections: Right L4/5 L5/1 (7/15/21)(9/2/21)\par ANGELIQUE (2/17/22) \par Previous Spine Surgery: N/A\par \par Imaging:\par MRI Lumbar Spine (4/5/21) - L3/4 disc bulge, L4/5 disc bulge with facet hypertrophy, grade 1 lithesis, L5/S1 herniation with abutment of the right S1 nerve root.  [] : no [FreeTextEntry3] : 02/03/2021 [FreeTextEntry6] : soreness, numbness [FreeTextEntry7] : Right shoulder to the arm and fingers [de-identified] : lifting, pushing [de-identified] : 4/6/22: follow up today after ANGELIQUE on 3/24/22.  Had 50% relief.  Still has pain with motion.\par \par 3/2/22: follow up today after ANGELIQUE on 2/17/22. Had 50% improvement. Would recommend another ANGELIQUE.\par \par 1/18/22: follow up today. Pain in the neck radiates to the right shoulder and down the right arm. Currently in physical therapy for the neck and lower back. Pain worse with ROM of the shoulder. \par \par MRI (8/4/21) Impression:\par 1. Straightening of the cervical lordosis, multilevel disc desiccation, and disc bulging, small posterior central\par protrusion at C4-C5 and small right paracentral protrusion at C5-C6.\par 2. No acute fracture, cord impingement or exiting nerve root impingement.\par \par 12/6/21: follow up today. since last visit had surgery with Dr. Tate. an anterior lumbar interbody fusion L5-S1, posterior spinal fusion L5-S1 on 11/9/21. Still has some pain from surgery. Continue current meds. Continue PT. Awaiting bone stim. \par \par 9/20/21- Patient did not have much relief with injection. Will see Dr. Tate on 9/29 for surgical consult.\par \par 8/3/21: follow u today after right L4/5 L5/s1 TFESI on 7/15/21. Had 50% relief. Will schedule repeat for cumulative effect. \par \par 6/9/21- Patient has not had injection. Will resubmit for TFESI. Patient states pain is worse and limits ADLs and QOL. Has a hard time walking, pain worsens with sitting for prolonged periods of time. \par \par 4/28/21: Patient presents for initial evaluation. She fell while she was at work. Her pain is in the back. Had numbness down the right leg to the foot on the day of the accident. Pain in the right leg to the knee worse when she walks. Pain keeps her up at night. sometimes the pain in the thigh gets intense and gets hard. she has to massage it to get better. She has been doing physical therapy (8 sessions total), chiropractic (9 sessions), Medrol dose pack (limited relief)\par \par She works packing in a factory. \par \par Subjective weakness:No\par Lower extremity paresthesias: Yes\par Bladder/bowel dysfunction:No\par \par Attempted modalities for current complaint:\par See above:\par \par Medications:No\par \par Injections: Right L4/5 L5/1 (7/15/21)(9/2/21)\par ANGELIQUE (2/17/22, 3/24/22)\par \par Previous Spine Surgery: N/A\par \par Imaging:\par MRI Lumbar Spine (4/5/21) - L3/4 disc bulge, L4/5 disc bulge with facet hypertrophy, grade 1 lithesis, L5/S1 herniation with abutment of the right S1 nerve root. \par \par Physician Disclaimer: I have personally reviewed and confirmed all HPI data with the patient

## 2022-09-14 NOTE — ASSESSMENT
[FreeTextEntry1] : After discussing various treatment options with the patient including but not limited to oral medications, physical therapy, exercise, modalities as well as interventional spinal injections, we have decided with the following plan:\par \par 1) Intervention Injection Therapy:\par I personally reviewed the MRI/CT scan images and agree with the radiologist's report. The radiological findings were discussed with the patient.\par The risks, benefits, contents and alternatives to injection were explained in full to the patient. Risks outlined include but are not limited to infection,sepsis, bleeding, post-dural puncture headache, nerve damage, temporary increase in pain, syncopal episode, failure to resolve symptoms, allergic reaction, symptom recurrence, and elevation of blood sugar in diabetics. Cortisone may cause immunosuppression. Patient understands the risks. All questions were answered. After discussion of options, patient requested an injection. Information regarding the injection was given to the patient. Which medications to stop prior to the injection was explained to the patient as well.\par \par Follow up in 1-2 weeks post injection for re-evaluation. \par Continue Home exercises, stretching, activity modification, physical therapy, and conservative care.\par \par SCS trial\par will get psych evaluation prior. \par She has filed numerous conservative therapies. Has pain despite surgical intervention. \par

## 2022-10-06 NOTE — ED ADULT TRIAGE NOTE - BSA (M2)
Dr. Lozano ordered to take off continuous EFM for the night. Patient will be put back on monitors in the morning for two hours and will have an NST Qshift. No additional new orders at this time.    1.66

## 2022-10-11 LAB — SARS-COV-2 N GENE NPH QL NAA+PROBE: NOT DETECTED

## 2022-10-12 ENCOUNTER — APPOINTMENT (OUTPATIENT)
Dept: ORTHOPEDIC SURGERY | Facility: CLINIC | Age: 47
End: 2022-10-12

## 2022-10-12 PROCEDURE — 99213 OFFICE O/P EST LOW 20 MIN: CPT

## 2022-10-12 PROCEDURE — 99072 ADDL SUPL MATRL&STAF TM PHE: CPT

## 2022-10-12 NOTE — DISCUSSION/SUMMARY
[de-identified] :  no indication for revision surgery - continue activity as tolerated and cane use - consideration for SCS - she will be getting an ablation tomorrow - not able to rtw - f/u 3 months

## 2022-10-12 NOTE — PHYSICAL EXAM
[Bending to right] : bending to right [4___] : right plantor flexors 4[unfilled]/5 [] : patient ambulates with assistive device [de-identified] : painful strength testing

## 2022-10-12 NOTE — HISTORY OF PRESENT ILLNESS
[Lower back] : lower back [Work related] : work related [Gradual] : gradual [Sudden] : sudden [6] : 6 [5] : 5 [Burning] : burning [Localized] : localized [Shooting] : shooting [Stabbing] : stabbing [Intermittent] : intermittent [Household chores] : household chores [Meds] : meds [Physical therapy] : physical therapy [Walking] : walking [Lying in bed] : lying in bed [Not working due to injury] : Work status: not working due to injury [de-identified] : WC 2/3/21 - workers comp injury \par \par 9/29/21: 45 yo Colombian speaking female referred for surgical evaluation - from the back and down the right leg - some days the back is worse than the legs - activity limiting pain \par \par has tried PT and chiro\par has tried injections with Dr Miller \par has tried ibuprofen \par Not using cane \par \par MRi L spine- L5-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 \par \par xrays L spine - small spondylolisthesis L5-S1 \par \par thyroid \par No hx of cancer\par \par not working since the accident\par \par 11/24/21: Here for fu - now about 2 weeks post op - wounds dry \par \par xrays today:\par L spine- implants in good position\par \par 1/5/22: here for fu - slowly improving - pain on the right side - has bone stim - not driving yet - wounds healed \par \par xrays today:\par L spine - implants in good position\par \par 2/16/22: Here for fu - plan at last was "PT" - using cane - cant walk very far \par \par overall the pain remains \par right leg with pain\par left leg is okay \par USING BONE STIM\par \par OOW with this injury\par \par Saw WC \par \par xrays today:\par l spine- implants in good position\par \par 3/30/22: Here for f/u. Plan at last visit "handicapped parking due to limited moblity - requires a cane \par not able to return to work rec cont with PT" Taking Percocet for pain with relief.\par \par 5/11/22: Here for follow-up; ~ 6 months post op; continues to have pain to the right side of the back and buttock that radiates down the right leg at tmies. No new onset weakness. She takes the percocet for pain as needed. She has been doing the PT and ambulating with a cane. \par \par xrays today L-spine 2v - implants in good position\par \par 6/15/22: Here for f/u. Last visit plan "Will continue PT - discussed weaning off the percocet which she is amendable to; will send percocet #50 this month. Referrals to outside pain management clinics were given - fu 6 weeks.  A  was used."\par Overall feels symptoms are worse. Continues PT and medication. right leg shooting pain - left leg is okay - down the side of the right thigh - all the way down - now about 7 months post op - cant stand up for long \par \par PT shes doing \par \par xrays today:\par l spine - implants in good posotoin\par AP PELVIS - negative \par \par 7/13/22: Here for MRI review.- pain in the lumbar spine has been severe - using cane - pain in the back and down the leg all the way down \par \par MRI L-spine:\par -Postoperative study following L5-S1 discectomy and lumbosacral fusion without thecal sac or nerve root compression at the operated level.\par -Mild degenerative disc disease at L3-4.\par -Small left-sided disc herniation at L4-5 without stenosis or nerve root compression.\par \par 10/12/2022: Here for fu; plan last visit was, "reviewed the MRi with her - questions answered - no indication for revision surgery - the surgery looks good unfortunately the pain remains and her activity is very limited, fu 3 months. consideration for SCS, not able to rtw." Overall doing about the same - pain shooting down the right leg. She will be getting an ablation with Dr Miller tomorrow. She is unable to work.  [] : Post Surgical Visit: no [FreeTextEntry1] : L spine  [FreeTextEntry3] : 02/03/2021 [FreeTextEntry5] : work injury  [de-identified] : xrays mris  [de-identified] : physical therapy

## 2022-10-13 ENCOUNTER — APPOINTMENT (OUTPATIENT)
Age: 47
End: 2022-10-13

## 2022-10-13 PROCEDURE — 64492 INJ PARAVERT F JNT C/T 3 LEV: CPT | Mod: 59,RT

## 2022-10-13 PROCEDURE — 64491 INJ PARAVERT F JNT C/T 2 LEV: CPT | Mod: 59,RT

## 2022-10-13 PROCEDURE — 64490 INJ PARAVERT F JNT C/T 1 LEV: CPT | Mod: RT

## 2022-10-24 ENCOUNTER — APPOINTMENT (OUTPATIENT)
Dept: ORTHOPEDIC SURGERY | Facility: CLINIC | Age: 47
End: 2022-10-24

## 2022-10-24 VITALS — BODY MASS INDEX: 27 KG/M2 | WEIGHT: 143 LBS | HEIGHT: 61 IN

## 2022-10-24 PROCEDURE — 99072 ADDL SUPL MATRL&STAF TM PHE: CPT

## 2022-10-24 PROCEDURE — 99213 OFFICE O/P EST LOW 20 MIN: CPT

## 2022-10-25 NOTE — HISTORY OF PRESENT ILLNESS
[de-identified] : The patient is a 45 year old R hand dominant female who presents today complaining of neck pain.\par Date of Injury/Onset: 2/3/21\par Pain: At Rest: 7/10 \par With Activity: 7/10 \par Mechanism of injury: patient fell while at work and injured her right shoulder and right knee and neck.\par This is a Work Related Injury being treated under Worker's Compensation.\par This is not an athletic injury occurring associated with an interscholastic or organized sports team.\par Quality of symptoms: throbbing pain all day, with movement it hurts more\par Improves with: PT, ice, heat\par Worse with: Any movement of her head hurts \par Treatment/Imaging/Studies Since Last Visit: PT, Injection in october w/ mal in neck\par Reports Available For Review Today: None\par Out of work/sport: out of work since 2/3/21\par School/Sport/Position/Occupation: warehouse \par Change since last visit: Doing ok, still experiencing pain but states the injection w/ mal has helped. Going to PT. \par Additional Information: None

## 2022-10-25 NOTE — IMAGING
[de-identified] : The patient is a well appearing 47 year old female of their stated age.\par Neck is supple. Patient has trapezium tenderness. + Spurling's test to the right.\par \par Effected Shoulder: right \par Inspection:\par Scapula Winging: Negative\par Deformity: None\par Erythema: None\par Ecchymosis: None\par Abrasions: None\par Effusion: None\par Range of Motion:\par Active Forward Flexion: 170 degrees \par Active Abduction: 170 degrees \par Passive Forward Flexion: 170 degrees \par Passive Abduction: 170 degrees \par ER @ 90 degrees: 85 degrees\par IR @ 90 degrees: 30 degrees\par ER @ 0 degrees: 30 degrees\par Motor Exam:\par Forward Flexion: 4 out of 5\par Flexion Plane of Scapula: 4 out of 5\par Abduction: 4 out of 5\par Internal Rotation: 5 out of 5\par External Rotation: 5 out of 5\par Distal Motor Strength: 5 out of 5\par Stability Testing:\par Anterior: 1+\par Posterior: 1+\par Sulcus N: 1+\par Sulcus ER: 1+\par Provocative Tests:\par Drop Arm: Negative\par Impingement: +\par B[place]ond: Negative\par X-Arm Adduction: +\par Belly Press: Negative\par Bear Hug: Negative\par Lift Off: Negative\par Apprehension: Negative\par Relocation:Negative\par Posterior Load & Shift: Negative\par Palpation:\par AC Joint: MILD TENDERNESS \par Clavicle: Nontender\par SC Joint: Nontender\par Bicepital Groove: tender\par Coracoid Process: Nontender\par Pectoralis Minor Tendon: Nontender\par Pectoralis Major Tendon: Nontender & palpably intact\par Proximal Humerus: Nontender\par Scapula Body: Nontender\par Medial Scapula Boarder: Nontender\par Scapula Spine: Nontender\par Neurologic Exam: Sensation to Light Touch:\par Axillary: Grossly intact\par Ulnar: Grossly intact\par Radial: Grossly intact\par Median: Grossly intact\par Other: N/A\par Circulatory/Pulses:\par Ulnar: 2+\par Radial: 2+\par Other Pertinent Findings: None\par Contralateral Shoulder\par Range of Motion:\par Active Forward Flexion: 180 degrees \par Active Abduction: 180 degrees \par Passive Forward Flexion: 180 degrees \par Passive Abduction: 180 degrees \par ER @ 90 degrees: 90 degrees\par IR @ 90 degrees: 45 degrees\par ER @ 0 degrees: 50 degrees\par Motor Exam:\par Forward Flexion: 5 out of 5\par Flexion Plane of Scapula: 5 out of 5\par Abduction: 5 out of 5\par Internal Rotation: 5 out of 5\par External Rotation: 5 out of 5\par Distal Motor Strength: 5 out of 5\par Stability Testing:\par Anterior: 1+\par Posterior: 1+\par Sulcus N: 1+\par Sulcus ER: 1+\par Other Pertinent Findings: None \par \par \par \par Assessment: The patient is a 47 year old female with right shoulder bursitis, right knee medial hamstring and patellar tendonitis and lumbar disc herniation pinching on the nerve root on the right side, Cervical radiculopathy, cervical disc herniation at C5-C6, right shoulder AC OA and impingement s/p work related injury ( WC DOI 2/3/21)\par \par The patient’s condition is acute and aggravated \par Documents/Results Reviewed Today: None \par Tests/Studies Independently Interpreted Today: None \par Pertinent findings include: right knee- medial joint line tenderness, + medial larry, patella tendon tenderness, 0-95, patella apprehension\par right shoulder- 170/170/85/30/30, 4/5 ABD FF FSP, 5/5 IR ER, + impingement, AC joint mild tenderness, biceptial groove tenderness, + spurlings to the right and tenderness of right trapezius. \par Confounding medical conditions/concerns: thyroid disease \par \par Plan: Patient experienced relief from second EPI, follow up with DR. Miller for third injection. Continue physical therapy, HEP, and stretching. Patient is out of work at this time. If improvement occurs with cervical disc pain, consider further intervention for right shoulder.\par Tests Ordered: None \par Prescription Medications Ordered: none\par Braces/DME Ordered: None \par Activity/Work/Sports Status: Out of work \par Additional Instructions: activity modifications \par Follow-Up: 6 weeks\par \par The patient's current medication management of their orthopedic diagnosis was reviewed today:\par (1) We discussed a comprehensive treatment plan that included possible pharmaceutical management involving the use of prescription strength medications including but not limited to options such as oral Naprosyn 500mg BID, once daily Meloxicam 15 mg, or 500-650 mg Tylenol versus over the counter oral medications and topical prescription NSAID Pennsaid vs over the counter Voltaren gel.\par (2) There is a moderate risk of morbidity with further treatment, especially from use of prescription strength medications and possible side effects of these medications which include upset stomach with oral medications, skin reactions to topical medications and cardiac/renal issues with long term use.\par (3) I recommended that the patient follow-up with their medical physician to discuss any significant specific potential issues with long term medication use such as interactions with current medications or with exacerbation of underlying medical comorbidities.\par (4) The benefits and risks associated with use of injectable, oral or topical, prescription and over the counter anti-inflammatory medications were discussed with the patient. The patient voiced understanding of the risks including but not limited to bleeding, stroke, kidney dysfunction, heart disease, and were referred to the black box warning label for further information.\par \par I, Gabriela Elena, attest that this documentation has been prepared under the direction and in the presence of Provider Dr. Perez Bruner.\par \par \par The documentation recorded by the scribe accurately reflects the service I personally performed and the decisions made by me.\par The patient was seen by me under the direct supervision of Dr. Perez Bruner\par

## 2022-11-07 ENCOUNTER — APPOINTMENT (OUTPATIENT)
Dept: PAIN MANAGEMENT | Facility: CLINIC | Age: 47
End: 2022-11-07

## 2022-11-07 VITALS — BODY MASS INDEX: 27 KG/M2 | WEIGHT: 143 LBS | HEIGHT: 61 IN

## 2022-11-07 PROCEDURE — 99214 OFFICE O/P EST MOD 30 MIN: CPT

## 2022-11-07 PROCEDURE — 99072 ADDL SUPL MATRL&STAF TM PHE: CPT

## 2022-11-07 NOTE — HISTORY OF PRESENT ILLNESS
[Neck] : neck [7] : 7 [6] : 6 [Dull/Aching] : dull/aching [Radiating] : radiating [Sharp] : sharp [Throbbing] : throbbing [Constant] : constant [Household chores] : household chores [Leisure] : leisure [Sleep] : sleep [Social interactions] : social interactions [Rest] : rest [Meds] : meds [Massage] : massage [Injection therapy] : injection therapy [Sitting] : sitting [Walking] : walking [Bending forward] : bending forward [Lying in bed] : lying in bed [Not working due to injury] : Work status: not working due to injury [Lower back] : lower back [FreeTextEntry1] : 11/07/2022: follow up today after right cervical MBB on 10/13/22. Had 80% relief.  Will schedule RFA.  She has failed multiple conservative therapies to manage back pain.  An SCS trial has been discussed and patient would like to schedule\par \par 09/14/2022: (15 minutes late)  follow up today.  had 80% relief from lower right cervical MBB #1 on 8/25/22.\par \par 07/27/2022: follow up today.  Still has pain in low back after L5-S1 fusion.  Pain is in low back and into right buttocks.   It was recommended by Dr. Tate she trial a SCS for her continued LE pain. Will get a psych trial prior. \par \par 7/11/22: still with R sided neck pain , will request MBB again.\par \par 4/6/22: follow up today after ANGELIQUE on 3/24/22. Had 50% relief. Still has pain with motion.\par \par 3/2/22: follow up today after ANGELIQUE on 2/17/22. Had 50% improvement. Would recommend another ANGELIQUE.\par \par 1/18/22: follow up today. Pain in the neck radiates to the right shoulder and down the right arm. Currently in physical therapy for the neck and lower back. Pain worse with ROM of the shoulder. \par \par MRI (8/4/21) Impression:\par 1. Straightening of the cervical lordosis, multilevel disc desiccation, and disc bulging, small posterior central\par protrusion at C4-C5 and small right paracentral protrusion at C5-C6.\par 2. No acute fracture, cord impingement or exiting nerve root impingement.\par \par 12/6/21: follow up today. since last visit had surgery with Dr. Tate. an anterior lumbar interbody fusion L5-S1,\par posterior spinal fusion L5-S1 on 11/9/21. Still has some pain from surgery. Continue current meds. Continue PT. Awaiting bone stim. \par \par 9/20/21- Patient did not have much relief with injection. Will see Dr. Tate on 9/29 for surgical consult.\par \par 8/3/21: follow u today after right L4/5 L5/s1 TFESI on 7/15/21. Had 50% relief. Will schedule repeat for cumulative\par effect. \par \par 6/9/21- Patient has not had injection. Will resubmit for TFESI. Patient states pain is worse and limits ADLs and QOL. Has a hard time walking, pain worsens with sitting for prolonged periods of time. \par \par 4/28/21: Patient presents for initial evaluation. She fell while she was at work. Her pain is in the back. Had numbness down the right leg to the foot on the day of the accident. Pain in the right leg to the knee worse when she walks. Pain keeps her up at night. sometimes the pain in the thigh gets intense and gets hard. she has to massage it to get better. She has been doing physical therapy (8 sessions total), chiropractic (9 sessions), Medrol dose pack (limited relief) She works packing in a factory. \par \par Subjective weakness:No\par Lower extremity paresthesias: Yes\par Bladder/bowel dysfunction:No\par Attempted modalities for current complaint:\par See above:\par Medications:No\par Injections: Right L4/5 L5/1 (7/15/21)(9/2/21)\par ANGELIQUE (2/17/22) \par right cervical MBB (8/25/22, 10/13/22)\par Previous Spine Surgery: N/A\par \par Imaging:\par MRI Lumbar Spine (4/5/21) - L3/4 disc bulge, L4/5 disc bulge with facet hypertrophy, grade 1 lithesis, L5/S1 herniation with abutment of the right S1 nerve root.  [] : no [FreeTextEntry3] : 02/03/2021 [FreeTextEntry6] : soreness, numbness [FreeTextEntry7] : Right shoulder to the arm and fingers [de-identified] : lifting, pushing [de-identified] : 4/6/22: follow up today after ANGELIQUE on 3/24/22.  Had 50% relief.  Still has pain with motion.\par \par 3/2/22: follow up today after ANGELIQUE on 2/17/22. Had 50% improvement. Would recommend another ANGELIQUE.\par \par 1/18/22: follow up today. Pain in the neck radiates to the right shoulder and down the right arm. Currently in physical therapy for the neck and lower back. Pain worse with ROM of the shoulder. \par \par MRI (8/4/21) Impression:\par 1. Straightening of the cervical lordosis, multilevel disc desiccation, and disc bulging, small posterior central\par protrusion at C4-C5 and small right paracentral protrusion at C5-C6.\par 2. No acute fracture, cord impingement or exiting nerve root impingement.\par \par 12/6/21: follow up today. since last visit had surgery with Dr. Tate. an anterior lumbar interbody fusion L5-S1, posterior spinal fusion L5-S1 on 11/9/21. Still has some pain from surgery. Continue current meds. Continue PT. Awaiting bone stim. \par \par 9/20/21- Patient did not have much relief with injection. Will see Dr. Tate on 9/29 for surgical consult.\par \par 8/3/21: follow u today after right L4/5 L5/s1 TFESI on 7/15/21. Had 50% relief. Will schedule repeat for cumulative effect. \par \par 6/9/21- Patient has not had injection. Will resubmit for TFESI. Patient states pain is worse and limits ADLs and QOL. Has a hard time walking, pain worsens with sitting for prolonged periods of time. \par \par 4/28/21: Patient presents for initial evaluation. She fell while she was at work. Her pain is in the back. Had numbness down the right leg to the foot on the day of the accident. Pain in the right leg to the knee worse when she walks. Pain keeps her up at night. sometimes the pain in the thigh gets intense and gets hard. she has to massage it to get better. She has been doing physical therapy (8 sessions total), chiropractic (9 sessions), Medrol dose pack (limited relief)\par \par She works packing in a factory. \par \par Subjective weakness:No\par Lower extremity paresthesias: Yes\par Bladder/bowel dysfunction:No\par \par Attempted modalities for current complaint:\par See above:\par \par Medications:No\par \par Injections: Right L4/5 L5/1 (7/15/21)(9/2/21)\par ANGELIQUE (2/17/22, 3/24/22)\par \par Previous Spine Surgery: N/A\par \par Imaging:\par MRI Lumbar Spine (4/5/21) - L3/4 disc bulge, L4/5 disc bulge with facet hypertrophy, grade 1 lithesis, L5/S1 herniation with abutment of the right S1 nerve root. \par \par Physician Disclaimer: I have personally reviewed and confirmed all HPI data with the patient

## 2022-11-09 ENCOUNTER — APPOINTMENT (OUTPATIENT)
Dept: PAIN MANAGEMENT | Facility: CLINIC | Age: 47
End: 2022-11-09

## 2022-11-16 ENCOUNTER — FORM ENCOUNTER (OUTPATIENT)
Age: 47
End: 2022-11-16

## 2022-11-28 NOTE — HISTORY OF PRESENT ILLNESS
[Neck] : neck [Sharp] : sharp [Shooting] : shooting [] : yes [Constant] : constant [Household chores] : household chores [Leisure] : leisure [Sleep] : sleep [Social interactions] : social interactions [Nothing helps with pain getting better] : Nothing helps with pain getting better [Sitting] : sitting [Walking] : walking [Bending forward] : bending forward [Lying in bed] : lying in bed [8] : 8 [7] : 7 [Physical therapy] : physical therapy [FreeTextEntry1] : 7/11/22: still with R sided neck pain , will request MBB again.\par \par \par 4/6/22: follow up today after ANGELIQUE on 3/24/22. Had 50% relief. Still has pain with motion.\par \par 3/2/22: follow up today after ANGELIQUE on 2/17/22. Had 50% improvement. Would recommend another ANGELIQUE.\par \par 1/18/22: follow up today. Pain in the neck radiates to the right shoulder and down the right arm. Currently in physical therapy for the neck and lower back. Pain worse with ROM of the shoulder. \par \par MRI (8/4/21) Impression:\par 1. Straightening of the cervical lordosis, multilevel disc desiccation, and disc bulging, small posterior central\par protrusion at C4-C5 and small right paracentral protrusion at C5-C6.\par 2. No acute fracture, cord impingement or exiting nerve root impingement.\par 12/6/21: follow up today. since last visit had surgery with Dr. Tate. an anterior lumbar interbody fusion L5-S1,\par posterior spinal fusion L5-S1 on 11/9/21. Still has some pain from surgery. Continue current meds. Continue PT. Awaiting bone stim. \par \par 9/20/21- Patient did not have much relief with injection. Will see Dr. Tate on 9/29 for surgical consult.\par \par 8/3/21: follow u today after right L4/5 L5/s1 TFESI on 7/15/21. Had 50% relief. Will schedule repeat for cumulative\par effect. \par \par 6/9/21- Patient has not had injection. Will resubmit for TFESI. Patient states pain is worse and limits ADLs and QOL. Has a hard time walking, pain worsens with sitting for prolonged periods of time. \par \par 4/28/21: Patient presents for initial evaluation. She fell while she was at work. Her pain is in the back. Had numbness down the right leg to the foot on the day of the accident. Pain in the right leg to the knee worse when she walks. Pain keeps her up at night. sometimes the pain in the thigh gets intense and gets hard. she has to massage it to get better. She has been doing physical therapy (8 sessions total), chiropractic (9 sessions), Medrol dose pack (limited relief) She works packing in a factory. \par \par Subjective weakness:No\par Lower extremity paresthesias: Yes\par Bladder/bowel dysfunction:No\par Attempted modalities for current complaint:\par See above:\par Medications:No\par Injections: Right L4/5 L5/1 (7/15/21)(9/2/21)\par ANGELIQUE (2/17/22) \par Previous Spine Surgery: N/A\par \par Imaging:\par MRI Lumbar Spine (4/5/21) - L3/4 disc bulge, L4/5 disc bulge with facet hypertrophy, grade 1 lithesis, L5/S1 herniation with abutment of the right S1 nerve root.  [de-identified] : lifting, pulling, neck movements [de-identified] : 4/6/22: follow up today after ANGELIQUE on 3/24/22.  Had 50% relief.  Still has pain with motion.\par \par 3/2/22: follow up today after ANGELIQUE on 2/17/22. Had 50% improvement. Would recommend another ANGELIQUE.\par \par 1/18/22: follow up today. Pain in the neck radiates to the right shoulder and down the right arm. Currently in physical therapy for the neck and lower back. Pain worse with ROM of the shoulder. \par \par MRI (8/4/21) Impression:\par 1. Straightening of the cervical lordosis, multilevel disc desiccation, and disc bulging, small posterior central\par protrusion at C4-C5 and small right paracentral protrusion at C5-C6.\par 2. No acute fracture, cord impingement or exiting nerve root impingement.\par \par 12/6/21: follow up today. since last visit had surgery with Dr. Tate. an anterior lumbar interbody fusion L5-S1, posterior spinal fusion L5-S1 on 11/9/21. Still has some pain from surgery. Continue current meds. Continue PT. Awaiting bone stim. \par \par 9/20/21- Patient did not have much relief with injection. Will see Dr. Tate on 9/29 for surgical consult.\par \par 8/3/21: follow u today after right L4/5 L5/s1 TFESI on 7/15/21. Had 50% relief. Will schedule repeat for cumulative effect. \par \par 6/9/21- Patient has not had injection. Will resubmit for TFESI. Patient states pain is worse and limits ADLs and QOL. Has a hard time walking, pain worsens with sitting for prolonged periods of time. \par \par 4/28/21: Patient presents for initial evaluation. She fell while she was at work. Her pain is in the back. Had numbness down the right leg to the foot on the day of the accident. Pain in the right leg to the knee worse when she walks. Pain keeps her up at night. sometimes the pain in the thigh gets intense and gets hard. she has to massage it to get better. She has been doing physical therapy (8 sessions total), chiropractic (9 sessions), Medrol dose pack (limited relief)\par \par She works packing in a factory. \par \par Subjective weakness:No\par Lower extremity paresthesias: Yes\par Bladder/bowel dysfunction:No\par \par Attempted modalities for current complaint:\par See above:\par \par Medications:No\par \par Injections: Right L4/5 L5/1 (7/15/21)(9/2/21)\par ANGELIQUE (2/17/22, 3/24/22)\par \par Previous Spine Surgery: N/A\par \par Imaging:\par MRI Lumbar Spine (4/5/21) - L3/4 disc bulge, L4/5 disc bulge with facet hypertrophy, grade 1 lithesis, L5/S1 herniation with abutment of the right S1 nerve root. \par \par Physician Disclaimer: I have personally reviewed and confirmed all HPI data with the patient

## 2022-12-05 ENCOUNTER — APPOINTMENT (OUTPATIENT)
Dept: ORTHOPEDIC SURGERY | Facility: CLINIC | Age: 47
End: 2022-12-05

## 2022-12-05 VITALS — BODY MASS INDEX: 27 KG/M2 | WEIGHT: 143 LBS | HEIGHT: 61 IN

## 2022-12-05 LAB — SARS-COV-2 N GENE NPH QL NAA+PROBE: NOT DETECTED

## 2022-12-05 PROCEDURE — 99213 OFFICE O/P EST LOW 20 MIN: CPT

## 2022-12-05 PROCEDURE — 99072 ADDL SUPL MATRL&STAF TM PHE: CPT

## 2022-12-06 NOTE — HISTORY OF PRESENT ILLNESS
[de-identified] : The patient is a 45 year old R hand dominant female who presents today complaining of neck pain.\par Date of Injury/Onset: 2/3/21\par Pain: At Rest: 7/10 \par With Activity: 8/10 \par Mechanism of injury: patient fell while at work and injured her right shoulder and right knee and neck.\par This is a Work Related Injury being treated under Worker's Compensation.\par This is not an athletic injury occurring associated with an interscholastic or organized sports team.\par Quality of symptoms: throbbing pain all day, with movement it hurts more\par Improves with: PT, ice, heat\par Worse with: Any movement of her head hurts \par Treatment/Imaging/Studies Since Last Visit: PT, had MBB with Dr. Miller 10/13\par Reports Available For Review Today: None\par Out of work/sport: out of work since 2/3/21\par School/Sport/Position/Occupation: warehouse \par Change since last visit: Had a lot of pain in her neck sleeping last night. Has an apt with Dr. Miller this week for SCS trial\par Additional Information: None

## 2022-12-06 NOTE — IMAGING
[de-identified] : The patient is a well appearing 47 year old female of their stated age.\par Neck is supple. Patient has trapezium tenderness. + Spurling's test to the right.\par \par Effected Shoulder: right \par Inspection:\par Scapula Winging: Negative\par Deformity: None\par Erythema: None\par Ecchymosis: None\par Abrasions: None\par Effusion: None\par Range of Motion:\par Active Forward Flexion: 170 degrees \par Active Abduction: 170 degrees \par Passive Forward Flexion: 170 degrees \par Passive Abduction: 170 degrees \par ER @ 90 degrees: 85 degrees\par IR @ 90 degrees: 30 degrees\par ER @ 0 degrees: 30 degrees\par Motor Exam:\par Forward Flexion: 4 out of 5\par Flexion Plane of Scapula: 4 out of 5\par Abduction: 4 out of 5\par Internal Rotation: 5 out of 5\par External Rotation: 5 out of 5\par Distal Motor Strength: 5 out of 5\par Stability Testing:\par Anterior: 1+\par Posterior: 1+\par Sulcus N: 1+\par Sulcus ER: 1+\par Provocative Tests:\par Drop Arm: Negative\par Impingement: +\par [place]: Negative\par X-Arm Adduction: +\par Belly Press: Negative\par Bear Hug: Negative\par Lift Off: Negative\par Apprehension: Negative\par Relocation:Negative\par Posterior Load & Shift: Negative\par Palpation:\par AC Joint: MILD TENDERNESS \par Clavicle: Nontender\par SC Joint: Nontender\par Bicepital Groove: tender\par Coracoid Process: Nontender\par Pectoralis Minor Tendon: Nontender\par Pectoralis Major Tendon: Nontender & palpably intact\par Proximal Humerus: Nontender\par Scapula Body: Nontender\par Medial Scapula Boarder: Nontender\par Scapula Spine: Nontender\par Neurologic Exam: Sensation to Light Touch:\par Axillary: Grossly intact\par Ulnar: Grossly intact\par Radial: Grossly intact\par Median: Grossly intact\par Other: N/A\par Circulatory/Pulses:\par Ulnar: 2+\par Radial: 2+\par Other Pertinent Findings: None\par Contralateral Shoulder\par Range of Motion:\par Active Forward Flexion: 180 degrees \par Active Abduction: 180 degrees \par Passive Forward Flexion: 180 degrees \par Passive Abduction: 180 degrees \par ER @ 90 degrees: 90 degrees\par IR @ 90 degrees: 45 degrees\par ER @ 0 degrees: 50 degrees\par Motor Exam:\par Forward Flexion: 5 out of 5\par Flexion Plane of Scapula: 5 out of 5\par Abduction: 5 out of 5\par Internal Rotation: 5 out of 5\par External Rotation: 5 out of 5\par Distal Motor Strength: 5 out of 5\par Stability Testing:\par Anterior: 1+\par Posterior: 1+\par Sulcus N: 1+\par Sulcus ER: 1+\par Other Pertinent Findings: None \par \par \par \par Assessment: The patient is a 47 year old female with right shoulder bursitis, right knee medial hamstring and patellar tendonitis and lumbar disc herniation pinching on the nerve root on the right side, Cervical radiculopathy, cervical disc herniation at C5-C6, right shoulder AC OA and impingement s/p work related injury ( WC DOI 2/3/21)\par \par The patient’s condition is acute and aggravated \par Documents/Results Reviewed Today: None \par Tests/Studies Independently Interpreted Today: None \par Pertinent findings include: right knee- medial joint line tenderness, + medial larry, patella tendon tenderness, 0-95, patella apprehension\par right shoulder- 170/170/85/30/30, 4/5 ABD FF FSP, 5/5 IR ER, + impingement, AC joint mild tenderness, biceptial groove tenderness, + spurlings to the right and tenderness of right trapezius. \par Confounding medical conditions/concerns: thyroid disease \par \par Plan: Continue physical therapy, HEP, and stretching. SHe will follow up with Dr. Miller for her third injection.  Patient is out of work at this time. If improvement occurs with cervical disc pain, consider further intervention for right shoulder.\par Tests Ordered: None \par Prescription Medications Ordered: none\par Braces/DME Ordered: None \par Activity/Work/Sports Status: Out of work \par Additional Instructions: activity modifications \par Follow-Up: 6 weeks\par \par The patient's current medication management of their orthopedic diagnosis was reviewed today:\par (1) We discussed a comprehensive treatment plan that included possible pharmaceutical management involving the use of prescription strength medications including but not limited to options such as oral Naprosyn 500mg BID, once daily Meloxicam 15 mg, or 500-650 mg Tylenol versus over the counter oral medications and topical prescription NSAID Pennsaid vs over the counter Voltaren gel.\par (2) There is a moderate risk of morbidity with further treatment, especially from use of prescription strength medications and possible side effects of these medications which include upset stomach with oral medications, skin reactions to topical medications and cardiac/renal issues with long term use.\par (3) I recommended that the patient follow-up with their medical physician to discuss any significant specific potential issues with long term medication use such as interactions with current medications or with exacerbation of underlying medical comorbidities.\par (4) The benefits and risks associated with use of injectable, oral or topical, prescription and over the counter anti-inflammatory medications were discussed with the patient. The patient voiced understanding of the risks including but not limited to bleeding, stroke, kidney dysfunction, heart disease, and were referred to the black box warning label for further information.\par \par I, Gabriela Elena, attest that this documentation has been prepared under the direction and in the presence of Provider Dr. Perez Bruner.\par \par The documentation recorded by the scribe accurately reflects the service I personally performed and the decisions made by me.\par

## 2022-12-08 ENCOUNTER — APPOINTMENT (OUTPATIENT)
Age: 47
End: 2022-12-08

## 2022-12-08 PROCEDURE — 64634 DESTROY C/TH FACET JNT ADDL: CPT | Mod: 59,RT

## 2022-12-08 PROCEDURE — 64633 DESTROY CERV/THOR FACET JNT: CPT | Mod: RT

## 2022-12-21 ENCOUNTER — APPOINTMENT (OUTPATIENT)
Dept: PAIN MANAGEMENT | Facility: CLINIC | Age: 47
End: 2022-12-21

## 2022-12-21 VITALS — BODY MASS INDEX: 27 KG/M2 | WEIGHT: 143 LBS | HEIGHT: 61 IN

## 2022-12-21 PROCEDURE — 99072 ADDL SUPL MATRL&STAF TM PHE: CPT

## 2022-12-21 PROCEDURE — 99213 OFFICE O/P EST LOW 20 MIN: CPT

## 2022-12-21 NOTE — HISTORY OF PRESENT ILLNESS
[Neck] : neck [Lower back] : lower back [7] : 7 [6] : 6 [Dull/Aching] : dull/aching [Radiating] : radiating [Sharp] : sharp [Throbbing] : throbbing [Constant] : constant [Household chores] : household chores [Leisure] : leisure [Sleep] : sleep [Social interactions] : social interactions [Rest] : rest [Meds] : meds [Massage] : massage [Injection therapy] : injection therapy [Sitting] : sitting [Walking] : walking [Bending forward] : bending forward [Lying in bed] : lying in bed [Not working due to injury] : Work status: not working due to injury [] : no [FreeTextEntry3] : 02/03/2021 [FreeTextEntry6] : soreness, numbness [FreeTextEntry7] : Right shoulder to the arm and fingers [de-identified] : lifting, pushing [FreeTextEntry1] : 12/21/22- FU Left C2-C4 RFA on 12/8 with 30% of relief so far. Discussing SCS trial\par \par 11/07/2022: follow up today after right cervical MBB on 10/13/22. Had 80% relief.  Will schedule RFA.  She has failed multiple conservative therapies to manage back pain.  An SCS trial has been discussed and patient would like to schedule\par \par 09/14/2022: (15 minutes late)  follow up today.  had 80% relief from lower right cervical MBB #1 on 8/25/22.\par \par 07/27/2022: follow up today.  Still has pain in low back after L5-S1 fusion.  Pain is in low back and into right buttocks.   It was recommended by Dr. Tate she trial a SCS for her continued LE pain. Will get a psych trial prior. \par \par 7/11/22: still with R sided neck pain , will request MBB again.\par \par 4/6/22: follow up today after ANGELIQUE on 3/24/22. Had 50% relief. Still has pain with motion.\par \par 3/2/22: follow up today after ANGELIQUE on 2/17/22. Had 50% improvement. Would recommend another ANGELIQUE.\par \par 1/18/22: follow up today. Pain in the neck radiates to the right shoulder and down the right arm. Currently in physical therapy for the neck and lower back. Pain worse with ROM of the shoulder. \par \par MRI (8/4/21) Impression:\par 1. Straightening of the cervical lordosis, multilevel disc desiccation, and disc bulging, small posterior central\par protrusion at C4-C5 and small right paracentral protrusion at C5-C6.\par 2. No acute fracture, cord impingement or exiting nerve root impingement.\par \par 12/6/21: follow up today. since last visit had surgery with Dr. Tate. an anterior lumbar interbody fusion L5-S1,\par posterior spinal fusion L5-S1 on 11/9/21. Still has some pain from surgery. Continue current meds. Continue PT. Awaiting bone stim. \par \par 9/20/21- Patient did not have much relief with injection. Will see Dr. Tate on 9/29 for surgical consult.\par \par 8/3/21: follow u today after right L4/5 L5/s1 TFESI on 7/15/21. Had 50% relief. Will schedule repeat for cumulative\par effect. \par \par 6/9/21- Patient has not had injection. Will resubmit for TFESI. Patient states pain is worse and limits ADLs and QOL. Has a hard time walking, pain worsens with sitting for prolonged periods of time. \par \par 4/28/21: Patient presents for initial evaluation. She fell while she was at work. Her pain is in the back. Had numbness down the right leg to the foot on the day of the accident. Pain in the right leg to the knee worse when she walks. Pain keeps her up at night. sometimes the pain in the thigh gets intense and gets hard. she has to massage it to get better. She has been doing physical therapy (8 sessions total), chiropractic (9 sessions), Medrol dose pack (limited relief) She works packing in a factory. \par \par Subjective weakness:No\par Lower extremity paresthesias: Yes\par Bladder/bowel dysfunction:No\par Attempted modalities for current complaint:\par See above:\par Medications:No\par Injections: Right L4/5 L5/1 (7/15/21)(9/2/21)\par ANGELIQUE (2/17/22) \par right cervical MBB (8/25/22, 10/13/22)\par Previous Spine Surgery: N/A\par \par Imaging:\par MRI Lumbar Spine (4/5/21) - L3/4 disc bulge, L4/5 disc bulge with facet hypertrophy, grade 1 lithesis, L5/S1 herniation with abutment of the right S1 nerve root.  [de-identified] : 4/6/22: follow up today after ANGELIQUE on 3/24/22.  Had 50% relief.  Still has pain with motion.\par \par 3/2/22: follow up today after ANGELIQUE on 2/17/22. Had 50% improvement. Would recommend another ANGELIQUE.\par \par 1/18/22: follow up today. Pain in the neck radiates to the right shoulder and down the right arm. Currently in physical therapy for the neck and lower back. Pain worse with ROM of the shoulder. \par \par MRI (8/4/21) Impression:\par 1. Straightening of the cervical lordosis, multilevel disc desiccation, and disc bulging, small posterior central\par protrusion at C4-C5 and small right paracentral protrusion at C5-C6.\par 2. No acute fracture, cord impingement or exiting nerve root impingement.\par \par 12/6/21: follow up today. since last visit had surgery with Dr. Tate. an anterior lumbar interbody fusion L5-S1, posterior spinal fusion L5-S1 on 11/9/21. Still has some pain from surgery. Continue current meds. Continue PT. Awaiting bone stim. \par \par 9/20/21- Patient did not have much relief with injection. Will see Dr. Tate on 9/29 for surgical consult.\par \par 8/3/21: follow u today after right L4/5 L5/s1 TFESI on 7/15/21. Had 50% relief. Will schedule repeat for cumulative effect. \par \par 6/9/21- Patient has not had injection. Will resubmit for TFESI. Patient states pain is worse and limits ADLs and QOL. Has a hard time walking, pain worsens with sitting for prolonged periods of time. \par \par 4/28/21: Patient presents for initial evaluation. She fell while she was at work. Her pain is in the back. Had numbness down the right leg to the foot on the day of the accident. Pain in the right leg to the knee worse when she walks. Pain keeps her up at night. sometimes the pain in the thigh gets intense and gets hard. she has to massage it to get better. She has been doing physical therapy (8 sessions total), chiropractic (9 sessions), Medrol dose pack (limited relief)\par \par She works packing in a factory. \par \par Subjective weakness:No\par Lower extremity paresthesias: Yes\par Bladder/bowel dysfunction:No\par \par Attempted modalities for current complaint:\par See above:\par \par Medications:No\par \par Injections: Right L4/5 L5/1 (7/15/21)(9/2/21)\par ANGELIQUE (2/17/22, 3/24/22)\par \par Previous Spine Surgery: N/A\par \par Imaging:\par MRI Lumbar Spine (4/5/21) - L3/4 disc bulge, L4/5 disc bulge with facet hypertrophy, grade 1 lithesis, L5/S1 herniation with abutment of the right S1 nerve root. \par \par Physician Disclaimer: I have personally reviewed and confirmed all HPI data with the patient

## 2023-01-23 ENCOUNTER — APPOINTMENT (OUTPATIENT)
Dept: ORTHOPEDIC SURGERY | Facility: CLINIC | Age: 48
End: 2023-01-23
Payer: OTHER MISCELLANEOUS

## 2023-01-23 VITALS — BODY MASS INDEX: 27 KG/M2 | WEIGHT: 143 LBS | HEIGHT: 61 IN

## 2023-01-23 PROCEDURE — 99072 ADDL SUPL MATRL&STAF TM PHE: CPT

## 2023-01-23 PROCEDURE — 99213 OFFICE O/P EST LOW 20 MIN: CPT

## 2023-01-23 PROCEDURE — 99213 OFFICE O/P EST LOW 20 MIN: CPT | Mod: ACP

## 2023-01-23 PROCEDURE — 99072 ADDL SUPL MATRL&STAF TM PHE: CPT | Mod: ACP

## 2023-01-24 NOTE — IMAGING
[de-identified] : The patient is a well appearing 47 year old female of their stated age.\par Neck is supple. Patient has trapezium tenderness. + Spurling's test to the right.\par \par Effected Shoulder: RIGHT \par Inspection:\par Scapula Winging: Negative\par Deformity: None\par Erythema: None\par Ecchymosis: None\par Abrasions: None\par Effusion: None\par \par Range of Motion:\par Active Forward Flexion: 170 degrees \par Active Abduction: 170 degrees \par Passive Forward Flexion: 170 degrees \par Passive Abduction: 170 degrees \par ER @ 90 degrees: 85 degrees\par IR @ 90 degrees: 30 degrees\par ER @ 0 degrees: 30 degrees\par \par Motor Exam:\par Forward Flexion: 4 out of 5\par Flexion Plane of Scapula: 4 out of 5\par Abduction: 4 out of 5\par Internal Rotation: 5 out of 5\par External Rotation: 5 out of 5\par Distal Motor Strength: 5 out of 5\par \par Stability Testing:\par Anterior: 1+\par Posterior: 1+\par Sulcus N: 1+\par Sulcus ER: 1+\par \par Provocative Tests:\par Drop Arm: Negative\par Impingement: +\par [place]: Negative\par X-Arm Adduction: +\par Belly Press: Negative\par Bear Hug: Negative\par Lift Off: Negative\par Apprehension: Negative\par Relocation:Negative\par Posterior Load & Shift: Negative\par \par Palpation:\par AC Joint: MILD TENDERNESS \par Clavicle: Nontender\par SC Joint: Nontender\par Bicepital Groove: tender\par Coracoid Process: Nontender\par Pectoralis Minor Tendon: Nontender\par Pectoralis Major Tendon: Nontender & palpably intact\par Proximal Humerus: Nontender\par Scapula Body: Nontender\par Medial Scapula Boarder: Nontender\par Scapula Spine: Nontender\par \par Neurologic Exam: Sensation to Light Touch:\par Axillary: Grossly intact\par Ulnar: Grossly intact\par Radial: Grossly intact\par Median: Grossly intact\par Other: N/A\par Circulatory/Pulses:\par Ulnar: 2+\par Radial: 2+\par Other Pertinent Findings: None\par \par Contralateral Shoulder\par Range of Motion:\par Active Forward Flexion: 180 degrees \par Active Abduction: 180 degrees \par Passive Forward Flexion: 180 degrees \par Passive Abduction: 180 degrees \par ER @ 90 degrees: 90 degrees\par IR @ 90 degrees: 45 degrees\par ER @ 0 degrees: 50 degrees\par Motor Exam:\par Forward Flexion: 5 out of 5\par Flexion Plane of Scapula: 5 out of 5\par Abduction: 5 out of 5\par Internal Rotation: 5 out of 5\par External Rotation: 5 out of 5\par Distal Motor Strength: 5 out of 5\par Stability Testing:\par Anterior: 1+\par Posterior: 1+\par Sulcus N: 1+\par Sulcus ER: 1+\par Other Pertinent Findings: None \par \par Assessment: The patient is a 47 year old female with right shoulder bursitis, right knee medial hamstring and patellar tendonitis and lumbar disc herniation pinching on the nerve root on the right side, Cervical radiculopathy, cervical disc herniation at C5-C6, right shoulder AC OA and impingement s/p work related injury ( WC DOI 2/3/21)\par \par The patient’s condition is chronic and aggravated \par Documents/Results Reviewed Today: None \par Tests/Studies Independently Interpreted Today: None \par Pertinent findings include: right knee- medial joint line tenderness, + medial larry, patella tendon tenderness, 0-95, patella apprehension\par right shoulder- 170/170/85/30/30, 4/5 ABD FF FSP, 5/5 IR ER, + impingement, AC joint mild tenderness, biceptial groove tenderness, + spurlings to the right and tenderness of right trapezius. \par Confounding medical conditions/concerns: thyroid disease \par \par Plan: Patient has failed 3 epidural corticosteroid injections with Dr. Miller. She will be referred Dr. Tate for possible surgical consultation. Continue physical therapy, HEP, and stretching for her right shoulder. Patient is out of work at this time. If improvement occurs with cervical disc pain, consider further intervention for right shoulder.\par Tests Ordered: None \par Prescription Medications Ordered: none\par Braces/DME Ordered: None \par Activity/Work/Sports Status: Out of work \par Additional Instructions: activity modifications \par Follow-Up: 6 weeks\par \par The patient's current medication management of their orthopedic diagnosis was reviewed today:\par (1) We discussed a comprehensive treatment plan that included possible pharmaceutical management involving the use of prescription strength medications including but not limited to options such as oral Naprosyn 500mg BID, once daily Meloxicam 15 mg, or 500-650 mg Tylenol versus over the counter oral medications and topical prescription NSAID Pennsaid vs over the counter Voltaren gel.\par (2) There is a moderate risk of morbidity with further treatment, especially from use of prescription strength medications and possible side effects of these medications which include upset stomach with oral medications, skin reactions to topical medications and cardiac/renal issues with long term use.\par (3) I recommended that the patient follow-up with their medical physician to discuss any significant specific potential issues with long term medication use such as interactions with current medications or with exacerbation of underlying medical comorbidities.\par (4) The benefits and risks associated with use of injectable, oral or topical, prescription and over the counter anti-inflammatory medications were discussed with the patient. The patient voiced understanding of the risks including but not limited to bleeding, stroke, kidney dysfunction, heart disease, and were referred to the black box warning label for further information.\par \par I, Gabriela Elena, attest that this documentation has been prepared under the direction and in the presence of Provider Dr. Perez Bruner.\par \par The documentation recorded by the scribe accurately reflects the service I personally performed and the decisions made by me.\par The patient was seen by me under the direct supervision of Dr. Perez Bruner\par \par \par

## 2023-01-24 NOTE — HISTORY OF PRESENT ILLNESS
[de-identified] : The patient is a 45 year old R hand dominant female who presents today complaining of neck pain.\par Date of Injury/Onset: 2/3/21\par Pain: At Rest: 7/10 \par With Activity: 8/10 \par Mechanism of injury: patient fell while at work and injured her right shoulder and right knee and neck.\par This is a Work Related Injury being treated under Worker's Compensation.\par This is not an athletic injury occurring associated with an interscholastic or organized sports team.\par Quality of symptoms: throbbing pain all day, with movement it hurts more\par Improves with: PT, ice, heat\par Worse with: Any movement of her head hurts \par Treatment/Imaging/Studies Since Last Visit: PT\par Reports Available For Review Today: None\par Out of work/sport: out of work since 2/3/21\par School/Sport/Position/Occupation: [employer] \par Change since last visit: Patient states that she is still in a lot of pain and hasn't started SCS trial\par Additional Information: None

## 2023-02-08 ENCOUNTER — APPOINTMENT (OUTPATIENT)
Dept: ORTHOPEDIC SURGERY | Facility: CLINIC | Age: 48
End: 2023-02-08
Payer: OTHER MISCELLANEOUS

## 2023-02-08 VITALS — WEIGHT: 155 LBS | BODY MASS INDEX: 29.27 KG/M2 | HEIGHT: 61 IN

## 2023-02-08 PROCEDURE — 99214 OFFICE O/P EST MOD 30 MIN: CPT

## 2023-02-08 PROCEDURE — 72100 X-RAY EXAM L-S SPINE 2/3 VWS: CPT

## 2023-02-08 PROCEDURE — 99072 ADDL SUPL MATRL&STAF TM PHE: CPT

## 2023-02-08 PROCEDURE — 72040 X-RAY EXAM NECK SPINE 2-3 VW: CPT

## 2023-02-08 NOTE — DISCUSSION/SUMMARY
[de-identified] : indicated for MRi C spine\par indicated for MRI l spine \par PT \par FU TO REVIEW THE mri\par NO CHANGE in disablity \par

## 2023-02-08 NOTE — PHYSICAL EXAM
[Bending to right] : bending to right [4___] : right plantor flexors 4[unfilled]/5 [] : patient ambulates with assistive device [de-identified] : painful strength testing

## 2023-02-08 NOTE — HISTORY OF PRESENT ILLNESS
[Lower back] : lower back [Work related] : work related [Gradual] : gradual [Sudden] : sudden [6] : 6 [5] : 5 [Burning] : burning [Localized] : localized [Shooting] : shooting [Stabbing] : stabbing [Intermittent] : intermittent [Household chores] : household chores [Meds] : meds [Physical therapy] : physical therapy [Walking] : walking [Lying in bed] : lying in bed [Not working due to injury] : Work status: not working due to injury [de-identified] : WC 2/3/21 - workers comp injury \par \par 9/29/21: 45 yo [place] speaking female referred for surgical evaluation - from the back and down the right leg - some days the back is worse than the legs - activity limiting pain \par \par has tried PT and chiro\par has tried injections with Dr Miller \par has tried ibuprofen \par Not using cane \par \par MRi L spine- L5-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 \par \par xrays L spine - small spondylolisthesis L5-S1 \par \par thyroid \par No hx of cancer\par \par not working since the accident\par \par 11/24/21: Here for fu - now about 2 weeks post op - wounds dry \par \par xrays today:\par L spine- implants in good position\par \par 1/5/22: here for fu - slowly improving - pain on the right side - has bone stim - not driving yet - wounds healed \par \par xrays today:\par L spine - implants in good position\par \par 2/16/22: Here for fu - plan at last was "PT" - using cane - cant walk very far \par \par \par overall the pain remains \par right leg with pain\par left leg is okay \par USING BONE STIM\par \par OOW with this injury\par \par Saw WC \par \par xrays today:\par l spine- implants in good position\par \par 3/30/22: Here for f/u. Plan at last visit "handicapped parking due to limited moblity - requires a cane \par not able to return to work rec cont with PT" Taking Percocet for pain with relief.\par \par 5/11/22: Here for follow-up; ~ 6 months post op; continues to have pain to the right side of the back and buttock that radiates down the right leg at tmies. No new onset weakness. She takes the percocet for pain as needed. She has been doing the PT and ambulating with a cane. \par \par xrays today L-spine 2v - implants in good position\par \par 6/15/22: Here for f/u. Last visit plan "Will continue PT - discussed weaning off the percocet which she is amendable to; will send percocet #50 this month. Referrals to outside pain management clinics were given - fu 6 weeks.  A  was used."\par Overall feels symptoms are worse. Continues PT and medication. right leg shooting pain - left leg is okay - down the side of the right thigh - all the way down - now about 7 months post op - cant stand up for long \par \par PT shes doing \par \par xrays today:\par l spine - implants in good posotoin\par AP PELVIS - negative \par \par 7/13/22: Here for MRI review.- pain in the lumbar spine has been severe - using cane - pain in the back and down the leg all the way down \par \par MRI L-spine:\par -Postoperative study following L5-S1 discectomy and lumbosacral fusion without thecal sac or nerve root compression at the operated level.\par -Mild degenerative disc disease at L3-4.\par -Small left-sided disc herniation at L4-5 without stenosis or nerve root compression.\par \par 10/12/2022: Here for fu; plan last visit was, "reviewed the MRi with her - questions answered - no indication for revision surgery - the surgery looks good unfortunately the pain remains and her activity is very limited, fu 3 months. consideration for SCS, not able to rtw." Overall doing about the same - pain shooting down the right leg. She will be getting an ablation with Dr Miller tomorrow. She is unable to work. \par \par 02/08/2023 here today for a workers comp follow up on the lower spine/cervical spine ,pt was doing physical therapy which it was helping ,still on pain,using a cane to ambulate  ,out of work,\par \par neck pain to the right arm - left arm is okay \par Lower back to the left leg \par \par xrays today:\par l spine - alif/psf L5-S1\par C spine - spondylosis \par \par  [] : Post Surgical Visit: no [FreeTextEntry1] : L spine  [FreeTextEntry3] : 02/03/2021 [FreeTextEntry5] : work injury  [de-identified] : xrays mris  [de-identified] : physical therapy

## 2023-02-15 ENCOUNTER — FORM ENCOUNTER (OUTPATIENT)
Age: 48
End: 2023-02-15

## 2023-02-16 ENCOUNTER — APPOINTMENT (OUTPATIENT)
Dept: MRI IMAGING | Facility: CLINIC | Age: 48
End: 2023-02-16
Payer: OTHER MISCELLANEOUS

## 2023-02-16 PROCEDURE — 99072 ADDL SUPL MATRL&STAF TM PHE: CPT

## 2023-02-16 PROCEDURE — 72148 MRI LUMBAR SPINE W/O DYE: CPT

## 2023-02-16 PROCEDURE — 72141 MRI NECK SPINE W/O DYE: CPT

## 2023-03-08 ENCOUNTER — APPOINTMENT (OUTPATIENT)
Dept: ORTHOPEDIC SURGERY | Facility: CLINIC | Age: 48
End: 2023-03-08
Payer: OTHER MISCELLANEOUS

## 2023-03-08 VITALS — WEIGHT: 155 LBS | BODY MASS INDEX: 29.27 KG/M2 | HEIGHT: 61 IN

## 2023-03-08 PROCEDURE — 99072 ADDL SUPL MATRL&STAF TM PHE: CPT

## 2023-03-08 PROCEDURE — 99214 OFFICE O/P EST MOD 30 MIN: CPT

## 2023-03-08 NOTE — HISTORY OF PRESENT ILLNESS
[Lower back] : lower back [Work related] : work related [Gradual] : gradual [Sudden] : sudden [6] : 6 [5] : 5 [Burning] : burning [Localized] : localized [Shooting] : shooting [Stabbing] : stabbing [Intermittent] : intermittent [Household chores] : household chores [Meds] : meds [Physical therapy] : physical therapy [Walking] : walking [Lying in bed] : lying in bed [Not working due to injury] : Work status: not working due to injury [de-identified] : WC 2/3/21 - workers comp injury \par \par 9/29/21: 47 yo Cameroonian speaking female referred for surgical evaluation - from the back and down the right leg - some days the back is worse than the legs - activity limiting pain \par \par has tried PT and chiro\par has tried injections with Dr Miller \par has tried ibuprofen \par Not using cane \par \par MRi L spine- L5-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 \par \par xrays L spine - small spondylolisthesis L5-S1 \par \par thyroid \par No hx of cancer\par \par not working since the accident\par \par 11/24/21: Here for fu - now about 2 weeks post op - wounds dry \par \par xrays today:\par L spine- implants in good position\par \par 1/5/22: here for fu - slowly improving - pain on the right side - has bone stim - not driving yet - wounds healed \par \par xrays today:\par L spine - implants in good position\par \par 2/16/22: Here for fu - plan at last was "PT" - using cane - cant walk very far \par \par \par overall the pain remains \par right leg with pain\par left leg is okay \par USING BONE STIM\par \par OOW with this injury\par \par Saw WC \par \par xrays today:\par l spine- implants in good position\par \par 3/30/22: Here for f/u. Plan at last visit "handicapped parking due to limited moblity - requires a cane \par not able to return to work rec cont with PT" Taking Percocet for pain with relief.\par \par 5/11/22: Here for follow-up; ~ 6 months post op; continues to have pain to the right side of the back and buttock that radiates down the right leg at tmies. No new onset weakness. She takes the percocet for pain as needed. She has been doing the PT and ambulating with a cane. \par \par xrays today L-spine 2v - implants in good position\par \par 6/15/22: Here for f/u. Last visit plan "Will continue PT - discussed weaning off the percocet which she is amendable to; will send percocet #50 this month. Referrals to outside pain management clinics were given - fu 6 weeks.  A  was used."\par Overall feels symptoms are worse. Continues PT and medication. right leg shooting pain - left leg is okay - down the side of the right thigh - all the way down - now about 7 months post op - cant stand up for long \par \par PT shes doing \par \par xrays today:\par l spine - implants in good posotoin\par AP PELVIS - negative \par \par 7/13/22: Here for MRI review.- pain in the lumbar spine has been severe - using cane - pain in the back and down the leg all the way down \par \par MRI L-spine:\par -Postoperative study following L5-S1 discectomy and lumbosacral fusion without thecal sac or nerve root compression at the operated level.\par -Mild degenerative disc disease at L3-4.\par -Small left-sided disc herniation at L4-5 without stenosis or nerve root compression.\par \par 10/12/2022: Here for fu; plan last visit was, "reviewed the MRi with her - questions answered - no indication for revision surgery - the surgery looks good unfortunately the pain remains and her activity is very limited, fu 3 months. consideration for SCS, not able to rtw." Overall doing about the same - pain shooting down the right leg. She will be getting an ablation with Dr Miller tomorrow. She is unable to work. \par \par 02/08/2023 here today for a workers comp follow up on the lower spine/cervical spine ,pt was doing physical therapy which it was helping ,still on pain,using a cane to ambulate  ,out of work,\par \par neck pain to the right arm - left arm is okay \par Lower back to the left leg \par \par xrays today:\par l spine - alif/psf L5-S1\par C spine - spondylosis \par \par \par 03/08/2023 here to review mri results on the cervical  and lower spine no changes since last visit - plan at last was "indicated for MRi C spine\par indicated for MRI l spine \par PT \par FU TO REVIEW THE mri\par NO CHANGE in disablity" - pain in both neck/back continues \par \par MRI C spine [facility] - 1. Straightening of lordosis less than on the prior study. Stable diffuse loss of disc signal.\par 2. C1-C2: Capsular thickening stable.\par 3. C3-C4: Minor retrolisthesis. Bulge with central herniation. This is stable from the prior study.\par 4. C4-C5: Bulge with central herniation. This is stable from the prior study.\par 5. C5-C6: Bulge with central herniation. This is stable from the prior study.\par 6. C6-C7: Broad bulge. This is stable from the prior study.\par \par MRi L spine [facility] - 1. Stable convex left curvature, straightening of lordosis.\par 2. T12-L1: Facet hypertrophy and ligamentum flavum hypertrophy. This is stable from the prior study.\par 3. L1-L2: Facet hypertrophy and ligamentum flavum hypertrophy. This is stable from the prior study.\par 4. L2-L3: Facet hypertrophy and ligamentum flavum hypertrophy. This is stable from the prior study.\par 5. L3-L4: Loss of disc signal. Facet hypertrophy, ligamentum flavum hypertrophy, and facet effusion. This is \par stable from the prior study.\par 6. L4-L5: Grade 1 anterior spondylolisthesis. Broad bulge, facet hypertrophy, and ligamentum flavum \par hypertrophy with proximal left foraminal herniation and mild proximal left foraminal stenosis. This is stable\par from the prior study.\par 7. L5-S1: Postoperative change with fusion and disc graft in good position. Facet arthrosis, ligamentum flavum \par hypertrophy.\par 8. Uterine leiomyoma with free fluid in the cul-de-sac and ovarian follicles. Ultrasound suggested for further \par evaluation.\par \par she was not aware of the fibroid [] : Post Surgical Visit: no [FreeTextEntry1] : L spine  [FreeTextEntry3] : 02/03/2021 [FreeTextEntry5] : work injury  [de-identified] : xrays mris done at [facility]  [de-identified] : physical therapy

## 2023-03-08 NOTE — DISCUSSION/SUMMARY
[de-identified] : REVIEWED THE imaging with her \par used  \par discussion of optoins \par \par no indicated for revisoin surgery \par consider stim with pain management?\par \par fu with me prn \par \par referred to OBGYN for the uterine leimyoma \par \par no change in disability

## 2023-03-08 NOTE — PHYSICAL EXAM
[Bending to right] : bending to right [4___] : right plantor flexors 4[unfilled]/5 [] : patient ambulates with assistive device [de-identified] : painful strength testing

## 2023-03-10 ENCOUNTER — RX RENEWAL (OUTPATIENT)
Age: 48
End: 2023-03-10

## 2023-03-15 ENCOUNTER — APPOINTMENT (OUTPATIENT)
Dept: PAIN MANAGEMENT | Facility: CLINIC | Age: 48
End: 2023-03-15
Payer: OTHER MISCELLANEOUS

## 2023-03-15 VITALS — BODY MASS INDEX: 28.7 KG/M2 | WEIGHT: 152 LBS | HEIGHT: 61 IN

## 2023-03-15 PROCEDURE — 99072 ADDL SUPL MATRL&STAF TM PHE: CPT

## 2023-03-15 PROCEDURE — 99214 OFFICE O/P EST MOD 30 MIN: CPT

## 2023-03-15 NOTE — HISTORY OF PRESENT ILLNESS
[Neck] : neck [Lower back] : lower back [6] : 6 [Dull/Aching] : dull/aching [Radiating] : radiating [Sharp] : sharp [Throbbing] : throbbing [Constant] : constant [Household chores] : household chores [Leisure] : leisure [Sleep] : sleep [Social interactions] : social interactions [Rest] : rest [Meds] : meds [Massage] : massage [Injection therapy] : injection therapy [Sitting] : sitting [Walking] : walking [Bending forward] : bending forward [Lying in bed] : lying in bed [Not working due to injury] : Work status: not working due to injury [Work related] : work related [10] : 10 [FreeTextEntry1] : 03/15/23-  awaiting psych eval and then can request SCS trial.  The patient would benefit from physical therapy. Short and Long Term goals would be improvement of pain level, improvement of range of motion, improvement of strength and overall improvement of quality of life.\par \par \par 12/21/22- FU Left C2-C4 RFA on 12/8 with 30% of relief so far. Discussing SCS trial\par \par 11/07/2022: follow up today after right cervical MBB on 10/13/22. Had 80% relief.  Will schedule RFA.  She has failed multiple conservative therapies to manage back pain.  An SCS trial has been discussed and patient would like to schedule\par \par 09/14/2022: (15 minutes late)  follow up today.  had 80% relief from lower right cervical MBB #1 on 8/25/22.\par \par 07/27/2022: follow up today.  Still has pain in low back after L5-S1 fusion.  Pain is in low back and into right buttocks.   It was recommended by Dr. Tate she trial a SCS for her continued LE pain. Will get a psych trial prior. \par \par 7/11/22: still with R sided neck pain , will request MBB again.\par \par 4/6/22: follow up today after ANGELIQUE on 3/24/22. Had 50% relief. Still has pain with motion.\par \par 3/2/22: follow up today after ANGELIQUE on 2/17/22. Had 50% improvement. Would recommend another ANGELIQUE.\par \par 1/18/22: follow up today. Pain in the neck radiates to the right shoulder and down the right arm. Currently in physical therapy for the neck and lower back. Pain worse with ROM of the shoulder. \par \par MRI (8/4/21) Impression:\par 1. Straightening of the cervical lordosis, multilevel disc desiccation, and disc bulging, small posterior central\par protrusion at C4-C5 and small right paracentral protrusion at C5-C6.\par 2. No acute fracture, cord impingement or exiting nerve root impingement.\par \par 12/6/21: follow up today. since last visit had surgery with Dr. Tate. an anterior lumbar interbody fusion L5-S1,\par posterior spinal fusion L5-S1 on 11/9/21. Still has some pain from surgery. Continue current meds. Continue PT. Awaiting bone stim. \par \par 9/20/21- Patient did not have much relief with injection. Will see Dr. Tate on 9/29 for surgical consult.\par \par 8/3/21: follow u today after right L4/5 L5/s1 TFESI on 7/15/21. Had 50% relief. Will schedule repeat for cumulative\par effect. \par \par 6/9/21- Patient has not had injection. Will resubmit for TFESI. Patient states pain is worse and limits ADLs and QOL. Has a hard time walking, pain worsens with sitting for prolonged periods of time. \par \par 4/28/21: Patient presents for initial evaluation. She fell while she was at work. Her pain is in the back. Had numbness down the right leg to the foot on the day of the accident. Pain in the right leg to the knee worse when she walks. Pain keeps her up at night. sometimes the pain in the thigh gets intense and gets hard. she has to massage it to get better. She has been doing physical therapy (8 sessions total), chiropractic (9 sessions), Medrol dose pack (limited relief) She works packing in a factory. \par \par Subjective weakness:No\par Lower extremity paresthesias: Yes\par Bladder/bowel dysfunction:No\par Attempted modalities for current complaint:\par See above:\par Medications:No\par Injections: Right L4/5 L5/1 (7/15/21)(9/2/21)\par ANGELIQUE (2/17/22) \par right cervical MBB (8/25/22, 10/13/22)\par Previous Spine Surgery: N/A\par \par Imaging:\par MRI Lumbar Spine (4/5/21) - L3/4 disc bulge, L4/5 disc bulge with facet hypertrophy, grade 1 lithesis, L5/S1 herniation with abutment of the right S1 nerve root.  [] : no [FreeTextEntry3] : 02/03/2021 [FreeTextEntry6] : soreness, numbness [FreeTextEntry7] : Right shoulder to the arm and fingers [de-identified] : lifting, pushing [de-identified] : 4/6/22: follow up today after ANGELIQUE on 3/24/22.  Had 50% relief.  Still has pain with motion.\par \par 3/2/22: follow up today after ANGELIQUE on 2/17/22. Had 50% improvement. Would recommend another ANGELIQUE.\par \par 1/18/22: follow up today. Pain in the neck radiates to the right shoulder and down the right arm. Currently in physical therapy for the neck and lower back. Pain worse with ROM of the shoulder. \par \par MRI (8/4/21) Impression:\par 1. Straightening of the cervical lordosis, multilevel disc desiccation, and disc bulging, small posterior central\par protrusion at C4-C5 and small right paracentral protrusion at C5-C6.\par 2. No acute fracture, cord impingement or exiting nerve root impingement.\par \par 12/6/21: follow up today. since last visit had surgery with Dr. Tate. an anterior lumbar interbody fusion L5-S1, posterior spinal fusion L5-S1 on 11/9/21. Still has some pain from surgery. Continue current meds. Continue PT. Awaiting bone stim. \par \par 9/20/21- Patient did not have much relief with injection. Will see Dr. Tate on 9/29 for surgical consult.\par \par 8/3/21: follow u today after right L4/5 L5/s1 TFESI on 7/15/21. Had 50% relief. Will schedule repeat for cumulative effect. \par \par 6/9/21- Patient has not had injection. Will resubmit for TFESI. Patient states pain is worse and limits ADLs and QOL. Has a hard time walking, pain worsens with sitting for prolonged periods of time. \par \par 4/28/21: Patient presents for initial evaluation. She fell while she was at work. Her pain is in the back. Had numbness down the right leg to the foot on the day of the accident. Pain in the right leg to the knee worse when she walks. Pain keeps her up at night. sometimes the pain in the thigh gets intense and gets hard. she has to massage it to get better. She has been doing physical therapy (8 sessions total), chiropractic (9 sessions), Medrol dose pack (limited relief)\par \par She works packing in a factory. \par \par Subjective weakness:No\par Lower extremity paresthesias: Yes\par Bladder/bowel dysfunction:No\par \par Attempted modalities for current complaint:\par See above:\par \par Medications:No\par \par Injections: Right L4/5 L5/1 (7/15/21)(9/2/21)\par ANGELIQUE (2/17/22, 3/24/22)\par \par Previous Spine Surgery: N/A\par \par Imaging:\par MRI Lumbar Spine (4/5/21) - L3/4 disc bulge, L4/5 disc bulge with facet hypertrophy, grade 1 lithesis, L5/S1 herniation with abutment of the right S1 nerve root. \par \par Physician Disclaimer: I have personally reviewed and confirmed all HPI data with the patient

## 2023-03-22 ENCOUNTER — FORM ENCOUNTER (OUTPATIENT)
Age: 48
End: 2023-03-22

## 2023-03-26 ENCOUNTER — FORM ENCOUNTER (OUTPATIENT)
Age: 48
End: 2023-03-26

## 2023-04-17 ENCOUNTER — RX RENEWAL (OUTPATIENT)
Age: 48
End: 2023-04-17

## 2023-05-20 ENCOUNTER — RX RENEWAL (OUTPATIENT)
Age: 48
End: 2023-05-20

## 2023-06-07 ENCOUNTER — APPOINTMENT (OUTPATIENT)
Dept: ORTHOPEDIC SURGERY | Facility: CLINIC | Age: 48
End: 2023-06-07
Payer: OTHER MISCELLANEOUS

## 2023-06-07 VITALS — WEIGHT: 152 LBS | HEIGHT: 61 IN | BODY MASS INDEX: 28.7 KG/M2

## 2023-06-07 DIAGNOSIS — Z86.39 PERSONAL HISTORY OF OTHER ENDOCRINE, NUTRITIONAL AND METABOLIC DISEASE: ICD-10-CM

## 2023-06-07 DIAGNOSIS — E78.00 PURE HYPERCHOLESTEROLEMIA, UNSPECIFIED: ICD-10-CM

## 2023-06-07 PROCEDURE — 99213 OFFICE O/P EST LOW 20 MIN: CPT | Mod: 25

## 2023-06-07 PROCEDURE — 99213 OFFICE O/P EST LOW 20 MIN: CPT | Mod: 25,ACP

## 2023-06-07 PROCEDURE — 20552 NJX 1/MLT TRIGGER POINT 1/2: CPT

## 2023-06-07 PROCEDURE — J3490M: CUSTOM | Mod: ACP

## 2023-06-07 PROCEDURE — J3490M: CUSTOM

## 2023-06-07 PROCEDURE — 20552 NJX 1/MLT TRIGGER POINT 1/2: CPT | Mod: ACP

## 2023-06-07 PROCEDURE — 99214 OFFICE O/P EST MOD 30 MIN: CPT | Mod: 25

## 2023-06-07 NOTE — WORK
[Does not reveal pre-existing condition(s) that may affect treatment/prognosis] : does not reveal pre-existing condition(s) that may affect treatment/prognosis [Cane] : cane [Rx may affect patient's ability to return to work, make patient drowsy, or other issue] : Rx may affect patient's ability to return to work, make patient drowsy, or other issue. [I provided the services listed above] :  I provided the services listed above.

## 2023-06-07 NOTE — DISCUSSION/SUMMARY
[de-identified] :  not indicated for revision surgery \par consider stim with pain management; currently getting evaluated for SCS trial\par using cane \par R TPI tolerated well\par \par fu with me prn \par \par fu with OBGYN for the uterine leimyoma \par \par no change in disability \par \par Patient seen by "Awa MOMIN" under the supervision of "Dr. Breezy Tate"\par

## 2023-06-07 NOTE — PROCEDURE
[Trigger point 1-2 muscle groups] : trigger point 1-2 muscle groups [Right] : of the right [Lumbar paraspinal muscle] : lumbar paraspinal muscle [Pain] : pain [X-ray evidence of Osteoarthritis on this or prior visit] : x-ray evidence of Osteoarthritis on this or prior visit [Alcohol] : alcohol [Betadine] : betadine [Ethyl Chloride sprayed topically] : ethyl chloride sprayed topically [Sterile technique used] : sterile technique used [___ cc    2%] : Lidocaine ~Vcc of 2%  [___ cc    0.5%] : Bupivacaine (Marcaine) ~Vcc of 0.5%  [___ cc    40mg] : Triamcinolone (Kenalog) ~Vcc of 40 mg  [] : Patient tolerated procedure well [Call if redness, pain or fever occur] : call if redness, pain or fever occur [Apply ice for 15min out of every hour for the next 12-24 hours as tolerated] : apply ice for 15 minutes out of every hour for the next 12-24 hours as tolerated [Patient was advised to rest the joint(s) for ____ days] : patient was advised to rest the joint(s) for [unfilled] days [Previous OTC use and PT nontherapeutic] : patient has tried OTC's including aspirin, Ibuprofen, Aleve, etc or prescription NSAIDS, and/or exercises at home and/or physical therapy without satisfactory response [Patient had decreased mobility in the joint] : patient had decreased mobility in the joint [Risks, benefits, alternatives discussed / Verbal consent obtained] : the risks benefits, and alternatives have been discussed, and verbal consent was obtained

## 2023-06-07 NOTE — PHYSICAL EXAM
[Bending to right] : bending to right [4___] : right plantor flexors 4[unfilled]/5 [] : patient ambulates with assistive device [de-identified] : painful strength testing

## 2023-06-07 NOTE — HISTORY OF PRESENT ILLNESS
[Neck] : neck [Lower back] : lower back [Work related] : work related [Gradual] : gradual [Sudden] : sudden [6] : 6 [5] : 5 [Burning] : burning [Localized] : localized [Shooting] : shooting [Stabbing] : stabbing [Intermittent] : intermittent [Household chores] : household chores [Meds] : meds [Physical therapy] : physical therapy [Walking] : walking [Lying in bed] : lying in bed [Not working due to injury] : Work status: not working due to injury [de-identified] : WC 2/3/21 - workers comp injury \par \par 9/29/21: 47 yo Cambodian speaking female referred for surgical evaluation - from the back and down the right leg - some days the back is worse than the legs - activity limiting pain \par \par has tried PT and chiro\par has tried injections with Dr Miller \par has tried ibuprofen \par Not using cane \par \par MRi L spine- L5-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 \par \par xrays L spine - small spondylolisthesis L5-S1 \par \par thyroid \par No hx of cancer\par \par not working since the accident\par \par 11/24/21: Here for fu - now about 2 weeks post op - wounds dry \par \par xrays today:\par L spine- implants in good position\par \par 1/5/22: here for fu - slowly improving - pain on the right side - has bone stim - not driving yet - wounds healed \par \par xrays today:\par L spine - implants in good position\par \par 2/16/22: Here for fu - plan at last was "PT" - using cane - cant walk very far \par \par 6/7/23: Here for fu; \par \par \par overall the pain remains \par right leg with pain\par left leg is okay \par USING BONE STIM\par \par OOW with this injury\par \par Saw WC \par \par xrays today:\par l spine- implants in good position\par \par 3/30/22: Here for f/u. Plan at last visit "handicapped parking due to limited moblity - requires a cane \par not able to return to work rec cont with PT" Taking Percocet for pain with relief.\par \par 5/11/22: Here for follow-up; ~ 6 months post op; continues to have pain to the right side of the back and buttock that radiates down the right leg at tmies. No new onset weakness. She takes the percocet for pain as needed. She has been doing the PT and ambulating with a cane. \par \par xrays today L-spine 2v - implants in good position\par \par 6/15/22: Here for f/u. Last visit plan "Will continue PT - discussed weaning off the percocet which she is amendable to; will send percocet #50 this month. Referrals to outside pain management clinics were given - fu 6 weeks.  A  was used."\par Overall feels symptoms are worse. Continues PT and medication. right leg shooting pain - left leg is okay - down the side of the right thigh - all the way down - now about 7 months post op - cant stand up for long \par \par PT shes doing \par \par xrays today:\par l spine - implants in good posotoin\par AP PELVIS - negative \par \par 7/13/22: Here for MRI review.- pain in the lumbar spine has been severe - using cane - pain in the back and down the leg all the way down \par \par MRI L-spine:\par -Postoperative study following L5-S1 discectomy and lumbosacral fusion without thecal sac or nerve root compression at the operated level.\par -Mild degenerative disc disease at L3-4.\par -Small left-sided disc herniation at L4-5 without stenosis or nerve root compression.\par \par 10/12/2022: Here for fu; plan last visit was, "reviewed the MRi with her - questions answered - no indication for revision surgery - the surgery looks good unfortunately the pain remains and her activity is very limited, fu 3 months. consideration for SCS, not able to rtw." Overall doing about the same - pain shooting down the right leg. She will be getting an ablation with Dr Miller tomorrow. She is unable to work. \par \par 02/08/2023 here today for a workers comp follow up on the lower spine/cervical spine ,pt was doing physical therapy which it was helping ,still on pain,using a cane to ambulate  ,out of work,\par \par neck pain to the right arm - left arm is okay \par Lower back to the left leg \par \par xrays today:\par l spine - alif/psf L5-S1\par C spine - spondylosis \par \par \par 03/08/2023 here to review mri results on the cervical  and lower spine no changes since last visit - plan at last was "indicated for MRi C spine\par indicated for MRI l spine \par PT \par FU TO REVIEW THE mri\par NO CHANGE in disablity" - pain in both neck/back continues \par \par MRI C spine [facility] - 1. Straightening of lordosis less than on the prior study. Stable diffuse loss of disc signal.\par 2. C1-C2: Capsular thickening stable.\par 3. C3-C4: Minor retrolisthesis. Bulge with central herniation. This is stable from the prior study.\par 4. C4-C5: Bulge with central herniation. This is stable from the prior study.\par 5. C5-C6: Bulge with central herniation. This is stable from the prior study.\par 6. C6-C7: Broad bulge. This is stable from the prior study.\par \par MRi L spine [facility] - 1. Stable convex left curvature, straightening of lordosis.\par 2. T12-L1: Facet hypertrophy and ligamentum flavum hypertrophy. This is stable from the prior study.\par 3. L1-L2: Facet hypertrophy and ligamentum flavum hypertrophy. This is stable from the prior study.\par 4. L2-L3: Facet hypertrophy and ligamentum flavum hypertrophy. This is stable from the prior study.\par 5. L3-L4: Loss of disc signal. Facet hypertrophy, ligamentum flavum hypertrophy, and facet effusion. This is \par stable from the prior study.\par 6. L4-L5: Grade 1 anterior spondylolisthesis. Broad bulge, facet hypertrophy, and ligamentum flavum \par hypertrophy with proximal left foraminal herniation and mild proximal left foraminal stenosis. This is stable\par from the prior study.\par 7. L5-S1: Postoperative change with fusion and disc graft in good position. Facet arthrosis, ligamentum flavum \par hypertrophy.\par 8. Uterine leiomyoma with free fluid in the cul-de-sac and ovarian follicles. Ultrasound suggested for further \par evaluation.\par \par she was not aware of the fibroid\par \par 06/07/2023; fu on the lower back back radiating into the right leg; symptoms worse with prolonged sitting. She is using a cane. Having SCS trial evaluation. She is taking the meloxicam with some relief. Following GYN for the uterine fibroid [] : Post Surgical Visit: no [FreeTextEntry1] : L spine  [FreeTextEntry3] : 02/03/2021 [FreeTextEntry5] : work injury  [de-identified] : xrays mris done at [facility]  [de-identified] : physical therapy

## 2023-07-05 ENCOUNTER — APPOINTMENT (OUTPATIENT)
Dept: PAIN MANAGEMENT | Facility: CLINIC | Age: 48
End: 2023-07-05

## 2023-07-12 ENCOUNTER — APPOINTMENT (OUTPATIENT)
Dept: PAIN MANAGEMENT | Facility: CLINIC | Age: 48
End: 2023-07-12
Payer: OTHER MISCELLANEOUS

## 2023-07-12 VITALS — WEIGHT: 154 LBS | BODY MASS INDEX: 29.07 KG/M2 | HEIGHT: 61 IN

## 2023-07-12 PROCEDURE — 99213 OFFICE O/P EST LOW 20 MIN: CPT

## 2023-07-12 NOTE — ASSESSMENT
[FreeTextEntry1] : After discussing various treatment options with the patient including but not limited to oral medications, physical therapy, exercise, modalities as well as interventional spinal injections, we have decided with the following plan:\par \par 1) Intervention Injection Therapy:\par I personally reviewed the MRI/CT scan images and agree with the radiologist's report. The radiological findings were discussed with the patient.\par The risks, benefits, contents and alternatives to injection were explained in full to the patient. Risks outlined include but are not limited to infection,sepsis, bleeding, post-dural puncture headache, nerve damage, temporary increase in pain, syncopal episode, failure to resolve symptoms, allergic reaction, symptom recurrence, and elevation of blood sugar in diabetics. Cortisone may cause immunosuppression. Patient understands the risks. All questions were answered. After discussion of options, patient requested an injection. Information regarding the injection was given to the patient. Which medications to stop prior to the injection was explained to the patient as well.\par \par Follow up in 1-2 weeks post injection for re-evaluation. \par Continue Home exercises, stretching, activity modification, physical therapy, and conservative care.\par \par SCS trial\par She has filed numerous conservative therapies. Has pain despite surgical intervention in the past. \par

## 2023-07-23 ENCOUNTER — FORM ENCOUNTER (OUTPATIENT)
Age: 48
End: 2023-07-23

## 2023-08-02 ENCOUNTER — APPOINTMENT (OUTPATIENT)
Dept: ORTHOPEDIC SURGERY | Facility: CLINIC | Age: 48
End: 2023-08-02
Payer: OTHER MISCELLANEOUS

## 2023-08-02 VITALS — HEIGHT: 61 IN | BODY MASS INDEX: 29.07 KG/M2 | WEIGHT: 154 LBS

## 2023-08-02 PROCEDURE — 99214 OFFICE O/P EST MOD 30 MIN: CPT

## 2023-08-02 NOTE — DISCUSSION/SUMMARY
[de-identified] :  not indicated for revision surgery  fu with pain management; will be getting SCS  using cane   fu with me in 2- 3 months   fu with OBGYN for the uterine leimyoma   no change in disability

## 2023-08-02 NOTE — HISTORY OF PRESENT ILLNESS
[Neck] : neck [Lower back] : lower back [Work related] : work related [Gradual] : gradual [Sudden] : sudden [6] : 6 [5] : 5 [Burning] : burning [Localized] : localized [Shooting] : shooting [Stabbing] : stabbing [Intermittent] : intermittent [Household chores] : household chores [Meds] : meds [Physical therapy] : physical therapy [Walking] : walking [Lying in bed] : lying in bed [Not working due to injury] : Work status: not working due to injury [de-identified] :  2/3/21 - workers comp injury   9/29/21: 45 yo [place]ian speaking female referred for surgical evaluation - from the back and down the right leg - some days the back is worse than the legs - activity limiting pain   has tried PT and chiro has tried injections with Dr Miller  has tried ibuprofen  Not using cane   MRi L spine- L5-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   xrays L spine - small spondylolisthesis L5-S1   thyroid  No hx of cancer  not working since the accident  11/24/21: Here for fu - now about 2 weeks post op - wounds dry   xrays today: L spine- implants in good position  1/5/22: here for fu - slowly improving - pain on the right side - has bone stim - not driving yet - wounds healed   xrays today: L spine - implants in good position  2/16/22: Here for fu - plan at last was "PT" - using cane - cant walk very far   6/7/23: Here for fu;    overall the pain remains  right leg with pain left leg is okay  USING BONE STIM  OOW with this injury  Saw WC   xrays today: l spine- implants in good position  3/30/22: Here for f/u. Plan at last visit "handicapped parking due to limited moblity - requires a cane  not able to return to work rec cont with PT" Taking Percocet for pain with relief.  5/11/22: Here for follow-up; ~ 6 months post op; continues to have pain to the right side of the back and buttock that radiates down the right leg at tmies. No new onset weakness. She takes the percocet for pain as needed. She has been doing the PT and ambulating with a cane.   xrays today L-spine 2v - implants in good position  6/15/22: Here for f/u. Last visit plan "Will continue PT - discussed weaning off the percocet which she is amendable to; will send percocet #50 this month. Referrals to outside pain management clinics were given - fu 6 weeks.  A  was used." Overall feels symptoms are worse. Continues PT and medication. right leg shooting pain - left leg is okay - down the side of the right thigh - all the way down - now about 7 months post op - cant stand up for long   PT shes doing   xrays today: l spine - implants in good posotoin AP PELVIS - negative   7/13/22: Here for MRI review.- pain in the lumbar spine has been severe - using cane - pain in the back and down the leg all the way down   MRI L-spine: -Postoperative study following L5-S1 discectomy and lumbosacral fusion without thecal sac or nerve root compression at the operated level. -Mild degenerative disc disease at L3-4. -Small left-sided disc herniation at L4-5 without stenosis or nerve root compression.  10/12/2022: Here for fu; plan last visit was, "reviewed the MRi with her - questions answered - no indication for revision surgery - the surgery looks good unfortunately the pain remains and her activity is very limited, fu 3 months. consideration for SCS, not able to rtw." Overall doing about the same - pain shooting down the right leg. She will be getting an ablation with Dr Miller tomorrow. She is unable to work.   02/08/2023 here today for a workers comp follow up on the lower spine/cervical spine ,pt was doing physical therapy which it was helping ,still on pain,using a cane to ambulate  ,out of work,  neck pain to the right arm - left arm is okay  Lower back to the left leg   xrays today: l spine - alif/psf L5-S1 C spine - spondylosis    03/08/2023 here to review mri results on the cervical  and lower spine no changes since last visit - plan at last was "indicated for MRi C spine indicated for MRI l spine  PT  FU TO REVIEW THE mri NO CHANGE in disablity" - pain in both neck/back continues   MRI C spine [facility] - 1. Straightening of lordosis less than on the prior study. Stable diffuse loss of disc signal. 2. C1-C2: Capsular thickening stable. 3. C3-C4: Minor retrolisthesis. Bulge with central herniation. This is stable from the prior study. 4. C4-C5: Bulge with central herniation. This is stable from the prior study. 5. C5-C6: Bulge with central herniation. This is stable from the prior study. 6. C6-C7: Broad bulge. This is stable from the prior study.  MRi L spine [facility] - 1. Stable convex left curvature, straightening of lordosis. 2. T12-L1: Facet hypertrophy and ligamentum flavum hypertrophy. This is stable from the prior study. 3. L1-L2: Facet hypertrophy and ligamentum flavum hypertrophy. This is stable from the prior study. 4. L2-L3: Facet hypertrophy and ligamentum flavum hypertrophy. This is stable from the prior study. 5. L3-L4: Loss of disc signal. Facet hypertrophy, ligamentum flavum hypertrophy, and facet effusion. This is  stable from the prior study. 6. L4-L5: Grade 1 anterior spondylolisthesis. Broad bulge, facet hypertrophy, and ligamentum flavum  hypertrophy with proximal left foraminal herniation and mild proximal left foraminal stenosis. This is stable from the prior study. 7. L5-S1: Postoperative change with fusion and disc graft in good position. Facet arthrosis, ligamentum flavum  hypertrophy. 8. Uterine leiomyoma with free fluid in the cul-de-sac and ovarian follicles. Ultrasound suggested for further  evaluation.  she was not aware of the fibroid  06/07/2023; fu on the lower back back radiating into the right leg; symptoms worse with prolonged sitting. She is using a cane. Having SCS trial evaluation. She is taking the meloxicam with some relief. Following GYN for the uterine fibroid  08/02/23 follow up workers comp neck and back pt is scheduled to get spine stimulator by Dr Miller 08/11/23. Pain in the back  radiating into the right lower leg; she has been taking tylenol and meloxicam. She is using a cane.  [] : Post Surgical Visit: no [FreeTextEntry1] : L spine  [FreeTextEntry3] : 02/03/2021 [FreeTextEntry5] : work injury  [de-identified] : xrays mris done at [facility]  [de-identified] : physical therapy

## 2023-08-02 NOTE — WORK
[Does not reveal pre-existing condition(s) that may affect treatment/prognosis] : does not reveal pre-existing condition(s) that may affect treatment/prognosis [Cane] : cane [Rx may affect patient's ability to return to work, make patient drowsy, or other issue] : Rx may affect patient's ability to return to work, make patient drowsy, or other issue. [I provided the services listed above] :  I provided the services listed above. [Total (100%)] : total (100%)

## 2023-08-02 NOTE — PHYSICAL EXAM
[Bending to right] : bending to right [4___] : right plantor flexors 4[unfilled]/5 [] : patient ambulates with assistive device [de-identified] : painful strength testing and guarding

## 2023-08-09 NOTE — HISTORY OF PRESENT ILLNESS
[Neck] : neck [Lower back] : lower back [Work related] : work related [10] : 10 [6] : 6 [Dull/Aching] : dull/aching [Radiating] : radiating [Sharp] : sharp ESRD on MWF as op. pt in known to me and has not been able to come as prescribed despite many counseling sessions including his mother and grandmother   plan for HD with UF again   increase dialyzer size  next HD 8/11/2023 [Throbbing] : throbbing [Constant] : constant [Household chores] : household chores [Leisure] : leisure [Sleep] : sleep [Social interactions] : social interactions [Rest] : rest [Meds] : meds [Massage] : massage [Injection therapy] : injection therapy [Sitting] : sitting [Walking] : walking [Bending forward] : bending forward [Lying in bed] : lying in bed [Not working due to injury] : Work status: not working due to injury [FreeTextEntry1] : 07/12/2023: (20 minutes late) follow up today. Language line used today for translation.  She had a psych clearance done back in january that we had never received. This was reviewed today and she is cleared. Pain in the in the neck, right shoulder, Pain also in the lower  back.  Pain in the right leg. When bad she has difficulty walking.  +n/t worse in the morning. Cramping in the right foot. \par \par 03/15/23-  awaiting psych eval and then can request SCS trial.  The patient would benefit from physical therapy. Short and Long Term goals would be improvement of pain level, improvement of range of motion, improvement of strength and overall improvement of quality of life.\par \par 12/21/22- FU Left C2-C4 RFA on 12/8 with 30% of relief so far. Discussing SCS trial\par \par 11/07/2022: follow up today after right cervical MBB on 10/13/22. Had 80% relief.  Will schedule RFA.  She has failed multiple conservative therapies to manage back pain.  An SCS trial has been discussed and patient would like to schedule\par \par 09/14/2022: (15 minutes late)  follow up today.  had 80% relief from lower right cervical MBB #1 on 8/25/22.\par \par 07/27/2022: follow up today.  Still has pain in low back after L5-S1 fusion.  Pain is in low back and into right buttocks.   It was recommended by Dr. Tate she trial a SCS for her continued LE pain. Will get a psych trial prior. \par \par 7/11/22: still with R sided neck pain , will request MBB again.\par \par 4/6/22: follow up today after ANGELIQUE on 3/24/22. Had 50% relief. Still has pain with motion.\par \par 3/2/22: follow up today after ANGELIQUE on 2/17/22. Had 50% improvement. Would recommend another ANGELIQUE.\par \par 1/18/22: follow up today. Pain in the neck radiates to the right shoulder and down the right arm. Currently in physical therapy for the neck and lower back. Pain worse with ROM of the shoulder. \par \par MRI (8/4/21) Impression:\par 1. Straightening of the cervical lordosis, multilevel disc desiccation, and disc bulging, small posterior central\par protrusion at C4-C5 and small right paracentral protrusion at C5-C6.\par 2. No acute fracture, cord impingement or exiting nerve root impingement.\par \par 12/6/21: follow up today. since last visit had surgery with Dr. Tate. an anterior lumbar interbody fusion L5-S1,\par posterior spinal fusion L5-S1 on 11/9/21. Still has some pain from surgery. Continue current meds. Continue PT. Awaiting bone stim. \par \par 9/20/21- Patient did not have much relief with injection. Will see Dr. Tate on 9/29 for surgical consult.\par \par 8/3/21: follow u today after right L4/5 L5/s1 TFESI on 7/15/21. Had 50% relief. Will schedule repeat for cumulative\par effect. \par \par 6/9/21- Patient has not had injection. Will resubmit for TFESI. Patient states pain is worse and limits ADLs and QOL. Has a hard time walking, pain worsens with sitting for prolonged periods of time. \par \par 4/28/21: Patient presents for initial evaluation. She fell while she was at work. Her pain is in the back. Had numbness down the right leg to the foot on the day of the accident. Pain in the right leg to the knee worse when she walks. Pain keeps her up at night. sometimes the pain in the thigh gets intense and gets hard. she has to massage it to get better. She has been doing physical therapy (8 sessions total), chiropractic (9 sessions), Medrol dose pack (limited relief) She works packing in a factory. \par \par Subjective weakness:No\par Lower extremity paresthesias: Yes\par Bladder/bowel dysfunction:No\par Attempted modalities for current complaint:\par See above:\par Medications:No\par Injections: Right L4/5 L5/1 (7/15/21)(9/2/21)\par ANGELIQUE (2/17/22) \par right cervical MBB (8/25/22, 10/13/22)\par Previous Spine Surgery: N/A\par \par Imaging:\par MRI Lumbar Spine (4/5/21) - L3/4 disc bulge, L4/5 disc bulge with facet hypertrophy, grade 1 lithesis, L5/S1 herniation with abutment of the right S1 nerve root.  [] : no [FreeTextEntry3] : 02/03/2021 [FreeTextEntry6] : soreness, numbness [FreeTextEntry7] : Right shoulder to the arm and fingers [de-identified] : lifting, pushing

## 2023-08-11 ENCOUNTER — APPOINTMENT (OUTPATIENT)
Age: 48
End: 2023-08-11
Payer: OTHER MISCELLANEOUS

## 2023-08-11 PROCEDURE — 95972 ALYS CPLX SP/PN NPGT W/PRGRM: CPT | Mod: 59

## 2023-08-11 PROCEDURE — 63650 IMPLANT NEUROELECTRODES: CPT

## 2023-08-11 PROCEDURE — L8680: CPT

## 2023-08-11 RX ORDER — CEPHALEXIN 500 MG/1
500 CAPSULE ORAL 4 TIMES DAILY
Qty: 28 | Refills: 0 | Status: ACTIVE | COMMUNITY
Start: 2023-08-11 | End: 1900-01-01

## 2023-08-16 ENCOUNTER — APPOINTMENT (OUTPATIENT)
Dept: PAIN MANAGEMENT | Facility: CLINIC | Age: 48
End: 2023-08-16
Payer: OTHER MISCELLANEOUS

## 2023-08-16 VITALS — WEIGHT: 154 LBS | HEIGHT: 61 IN | BODY MASS INDEX: 29.07 KG/M2

## 2023-08-16 PROCEDURE — 99213 OFFICE O/P EST LOW 20 MIN: CPT

## 2023-08-16 NOTE — ASSESSMENT
[FreeTextEntry1] : After discussing various treatment options with the patient including but not limited to oral medications, physical therapy, exercise, modalities as well as interventional spinal injections, we have decided with the following plan:  1) will abandon the idea of stim at this point.

## 2023-08-16 NOTE — HISTORY OF PRESENT ILLNESS
[Neck] : neck [Lower back] : lower back [Work related] : work related [10] : 10 [6] : 6 [Dull/Aching] : dull/aching [Radiating] : radiating [Sharp] : sharp [Throbbing] : throbbing [Constant] : constant [Household chores] : household chores [Leisure] : leisure [Sleep] : sleep [Social interactions] : social interactions [Rest] : rest [Meds] : meds [Massage] : massage [Injection therapy] : injection therapy [Sitting] : sitting [Walking] : walking [Bending forward] : bending forward [Lying in bed] : lying in bed [Not working due to injury] : Work status: not working due to injury [FreeTextEntry1] : 8/16/23: pt here for stim removal. Unfortunately, there was a lot of communication difficulty with the patient during the trial and she did not get the full experience. She did get some improvement of her pain following. She reports she does not feel comfortable.   07/12/2023: (20 minutes late) follow up today. Language line used today for translation.  She had a psych clearance done back in january that we had never received. This was reviewed today and she is cleared. Pain in the in the neck, right shoulder, Pain also in the lower  back.  Pain in the right leg. When bad she has difficulty walking.  +n/t worse in the morning. Cramping in the right foot.   03/15/23-  awaiting psych eval and then can request SCS trial.  The patient would benefit from physical therapy. Short and Long Term goals would be improvement of pain level, improvement of range of motion, improvement of strength and overall improvement of quality of life.  12/21/22- FU Left C2-C4 RFA on 12/8 with 30% of relief so far. Discussing SCS trial  11/07/2022: follow up today after right cervical MBB on 10/13/22. Had 80% relief.  Will schedule RFA.  She has failed multiple conservative therapies to manage back pain.  An SCS trial has been discussed and patient would like to schedule  09/14/2022: (15 minutes late)  follow up today.  had 80% relief from lower right cervical MBB #1 on 8/25/22.  07/27/2022: follow up today.  Still has pain in low back after L5-S1 fusion.  Pain is in low back and into right buttocks.   It was recommended by Dr. Tate she trial a SCS for her continued LE pain. Will get a psych trial prior.   7/11/22: still with R sided neck pain , will request MBB again.  4/6/22: follow up today after ANGELIQUE on 3/24/22. Had 50% relief. Still has pain with motion.  3/2/22: follow up today after ANGELIQUE on 2/17/22. Had 50% improvement. Would recommend another ANGELIQUE.  1/18/22: follow up today. Pain in the neck radiates to the right shoulder and down the right arm. Currently in physical therapy for the neck and lower back. Pain worse with ROM of the shoulder.   MRI (8/4/21) Impression: 1. Straightening of the cervical lordosis, multilevel disc desiccation, and disc bulging, small posterior central protrusion at C4-C5 and small right paracentral protrusion at C5-C6. 2. No acute fracture, cord impingement or exiting nerve root impingement.  12/6/21: follow up today. since last visit had surgery with Dr. Tate. an anterior lumbar interbody fusion L5-S1, posterior spinal fusion L5-S1 on 11/9/21. Still has some pain from surgery. Continue current meds. Continue PT. Awaiting bone stim.   9/20/21- Patient did not have much relief with injection. Will see Dr. Tate on 9/29 for surgical consult.  8/3/21: follow u today after right L4/5 L5/s1 TFESI on 7/15/21. Had 50% relief. Will schedule repeat for cumulative effect.   6/9/21- Patient has not had injection. Will resubmit for TFESI. Patient states pain is worse and limits ADLs and QOL. Has a hard time walking, pain worsens with sitting for prolonged periods of time.   4/28/21: Patient presents for initial evaluation. She fell while she was at work. Her pain is in the back. Had numbness down the right leg to the foot on the day of the accident. Pain in the right leg to the knee worse when she walks. Pain keeps her up at night. sometimes the pain in the thigh gets intense and gets hard. she has to massage it to get better. She has been doing physical therapy (8 sessions total), chiropractic (9 sessions), Medrol dose pack (limited relief) She works packing in a factory.   Subjective weakness:No Lower extremity paresthesias: Yes Bladder/bowel dysfunction:No Attempted modalities for current complaint: See above: Medications:No Injections: Right L4/5 L5/1 (7/15/21)(9/2/21) ANGELIQUE (2/17/22)  right cervical MBB (8/25/22, 10/13/22) Previous Spine Surgery: N/A  Imaging: MRI Lumbar Spine (4/5/21) - L3/4 disc bulge, L4/5 disc bulge with facet hypertrophy, grade 1 lithesis, L5/S1 herniation with abutment of the right S1 nerve root.  [] : no [FreeTextEntry3] : 02/03/2021 [FreeTextEntry6] : soreness, numbness [FreeTextEntry7] : Right shoulder to the arm and fingers [de-identified] : lifting, pushing

## 2023-08-16 NOTE — PHYSICAL EXAM
[Normal Coordination] : normal coordination [Normal Mood and Affect] : normal mood and affect [NL (45)] : right lateral flexion 45 degrees [5___] : left biceps 5[unfilled]/5 [4___] : right hamstring 4[unfilled]/5 [Right lower extremity below knee] : right lower extremity below knee [Right lower extremity above knee] : right lower extremity above knee [] : no ecchymosis [FreeTextEntry3] : dressing removed. THey had made their own  with masking tape.  [FreeTextEntry8] : right worse than left

## 2023-10-04 ENCOUNTER — APPOINTMENT (OUTPATIENT)
Dept: ORTHOPEDIC SURGERY | Facility: CLINIC | Age: 48
End: 2023-10-04
Payer: OTHER MISCELLANEOUS

## 2023-10-04 PROCEDURE — 99213 OFFICE O/P EST LOW 20 MIN: CPT

## 2023-10-04 RX ORDER — MELOXICAM 7.5 MG/1
7.5 TABLET ORAL
Qty: 60 | Refills: 0 | Status: ACTIVE | COMMUNITY
Start: 2023-02-08 | End: 1900-01-01

## 2024-01-03 ENCOUNTER — APPOINTMENT (OUTPATIENT)
Dept: ORTHOPEDIC SURGERY | Facility: CLINIC | Age: 49
End: 2024-01-03
Payer: OTHER MISCELLANEOUS

## 2024-01-03 VITALS — BODY MASS INDEX: 29.07 KG/M2 | HEIGHT: 61 IN | WEIGHT: 154 LBS

## 2024-01-03 VITALS — WEIGHT: 154 LBS | HEIGHT: 61 IN | BODY MASS INDEX: 29.07 KG/M2

## 2024-01-03 PROCEDURE — 99213 OFFICE O/P EST LOW 20 MIN: CPT

## 2024-01-03 NOTE — WORK
[Total (100%)] : total (100%) [Does not reveal pre-existing condition(s) that may affect treatment/prognosis] : does not reveal pre-existing condition(s) that may affect treatment/prognosis [Cane] : cane [Rx may affect patient's ability to return to work, make patient drowsy, or other issue] : Rx may affect patient's ability to return to work, make patient drowsy, or other issue. [I provided the services listed above] :  I provided the services listed above.

## 2024-01-04 NOTE — PHYSICAL EXAM
[Bending to right] : bending to right [4___] : right plantor flexors 4[unfilled]/5 [] : patient ambulates with assistive device [de-identified] : painful strength testing and guarding

## 2024-01-04 NOTE — HISTORY OF PRESENT ILLNESS
[Neck] : neck [Lower back] : lower back [Work related] : work related [Gradual] : gradual [Sudden] : sudden [6] : 6 [5] : 5 [Burning] : burning [Localized] : localized [Shooting] : shooting [Stabbing] : stabbing [Intermittent] : intermittent [Household chores] : household chores [Meds] : meds [Physical therapy] : physical therapy [Walking] : walking [Lying in bed] : lying in bed [Not working due to injury] : Work status: not working due to injury [de-identified] :  2/3/21 - workers comp injury   9/29/21: 47 yo Italian speaking female referred for surgical evaluation - from the back and down the right leg - some days the back is worse than the legs - activity limiting pain   has tried PT and chiro has tried injections with Dr Miller  has tried ibuprofen  Not using cane   MRi L spine- L5-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   xrays L spine - small spondylolisthesis L5-S1   thyroid  No hx of cancer  not working since the accident  11/24/21: Here for fu - now about 2 weeks post op - wounds dry   xrays today: L spine- implants in good position  1/5/22: here for fu - slowly improving - pain on the right side - has bone stim - not driving yet - wounds healed   xrays today: L spine - implants in good position  2/16/22: Here for fu - plan at last was "PT" - using cane - cant walk very far   6/7/23: Here for fu;    overall the pain remains  right leg with pain left leg is okay  USING BONE STIM  OOW with this injury  Saw WC   xrays today: l spine- implants in good position  3/30/22: Here for f/u. Plan at last visit "handicapped parking due to limited moblity - requires a cane  not able to return to work rec cont with PT" Taking Percocet for pain with relief.  5/11/22: Here for follow-up; ~ 6 months post op; continues to have pain to the right side of the back and buttock that radiates down the right leg at tmies. No new onset weakness. She takes the percocet for pain as needed. She has been doing the PT and ambulating with a cane.   xrays today L-spine 2v - implants in good position  6/15/22: Here for f/u. Last visit plan "Will continue PT - discussed weaning off the percocet which she is amendable to; will send percocet #50 this month. Referrals to outside pain management clinics were given - fu 6 weeks.  A  was used." Overall feels symptoms are worse. Continues PT and medication. right leg shooting pain - left leg is okay - down the side of the right thigh - all the way down - now about 7 months post op - cant stand up for long   PT shes doing   xrays today: l spine - implants in good posotoin AP PELVIS - negative   7/13/22: Here for MRI review.- pain in the lumbar spine has been severe - using cane - pain in the back and down the leg all the way down   MRI L-spine: -Postoperative study following L5-S1 discectomy and lumbosacral fusion without thecal sac or nerve root compression at the operated level. -Mild degenerative disc disease at L3-4. -Small left-sided disc herniation at L4-5 without stenosis or nerve root compression.  10/12/2022: Here for fu; plan last visit was, "reviewed the MRi with her - questions answered - no indication for revision surgery - the surgery looks good unfortunately the pain remains and her activity is very limited, fu 3 months. consideration for SCS, not able to rtw." Overall doing about the same - pain shooting down the right leg. She will be getting an ablation with Dr Miller tomorrow. She is unable to work.   02/08/2023 here today for a workers comp follow up on the lower spine/cervical spine ,pt was doing physical therapy which it was helping ,still on pain,using a cane to ambulate  ,out of work,  neck pain to the right arm - left arm is okay  Lower back to the left leg   xrays today: l spine - alif/psf L5-S1 C spine - spondylosis    03/08/2023 here to review mri results on the cervical  and lower spine no changes since last visit - plan at last was "indicated for MRi C spine indicated for MRI l spine  PT  FU TO REVIEW THE mri NO CHANGE in disablity" - pain in both neck/back continues   MRI C spine [facility] - 1. Straightening of lordosis less than on the prior study. Stable diffuse loss of disc signal. 2. C1-C2: Capsular thickening stable. 3. C3-C4: Minor retrolisthesis. Bulge with central herniation. This is stable from the prior study. 4. C4-C5: Bulge with central herniation. This is stable from the prior study. 5. C5-C6: Bulge with central herniation. This is stable from the prior study. 6. C6-C7: Broad bulge. This is stable from the prior study.  MRi L spine [facility] - 1. Stable convex left curvature, straightening of lordosis. 2. T12-L1: Facet hypertrophy and ligamentum flavum hypertrophy. This is stable from the prior study. 3. L1-L2: Facet hypertrophy and ligamentum flavum hypertrophy. This is stable from the prior study. 4. L2-L3: Facet hypertrophy and ligamentum flavum hypertrophy. This is stable from the prior study. 5. L3-L4: Loss of disc signal. Facet hypertrophy, ligamentum flavum hypertrophy, and facet effusion. This is  stable from the prior study. 6. L4-L5: Grade 1 anterior spondylolisthesis. Broad bulge, facet hypertrophy, and ligamentum flavum  hypertrophy with proximal left foraminal herniation and mild proximal left foraminal stenosis. This is stable from the prior study. 7. L5-S1: Postoperative change with fusion and disc graft in good position. Facet arthrosis, ligamentum flavum  hypertrophy. 8. Uterine leiomyoma with free fluid in the cul-de-sac and ovarian follicles. Ultrasound suggested for further  evaluation.  she was not aware of the fibroid  06/07/2023; fu on the lower back back radiating into the right leg; symptoms worse with prolonged sitting. She is using a cane. Having SCS trial evaluation. She is taking the meloxicam with some relief. Following GYN for the uterine fibroid  08/02/23 follow up workers comp neck and back pt is scheduled to get spine stimulator by Dr Miller 08/11/23. Pain in the back  radiating into the right lower leg; she has been taking tylenol and meloxicam. She is using a cane.   10/04/23  follow up workers comp back , had spinal cord stimulator removed by Dr Miller  last visit. Some days better than others. Difficulty with prolonged walking.   01/03/24 follow up workers comp back  had spinal cord stimulator removed, taking tylenol with some improvement. She  tried the meloxicam without relief.   using a cane to ambulate  [FreeTextEntry1] : L spine  [] : Post Surgical Visit: no [FreeTextEntry3] : 02/03/2021 [FreeTextEntry5] : work injury  [de-identified] : xrays mris done at [facility]  [de-identified] : physical therapy

## 2024-01-04 NOTE — DISCUSSION/SUMMARY
[Medication Risks Reviewed] : Medication risks reviewed [de-identified] : reviewed the case  not indicated for revision surgery  fu with pain management PT and lidoderm patches  handicapped parking  using cane   fu with OBGYN for the uterine leimyoma   no change in disability   45 mins spent on this encounter

## 2024-03-06 ENCOUNTER — APPOINTMENT (OUTPATIENT)
Dept: ORTHOPEDIC SURGERY | Facility: CLINIC | Age: 49
End: 2024-03-06
Payer: OTHER MISCELLANEOUS

## 2024-03-06 VITALS — WEIGHT: 154 LBS | HEIGHT: 61 IN | BODY MASS INDEX: 29.07 KG/M2

## 2024-03-06 DIAGNOSIS — M54.16 RADICULOPATHY, LUMBAR REGION: ICD-10-CM

## 2024-03-06 PROCEDURE — 99213 OFFICE O/P EST LOW 20 MIN: CPT

## 2024-03-06 PROCEDURE — 99214 OFFICE O/P EST MOD 30 MIN: CPT

## 2024-03-06 RX ORDER — LIDOCAINE 5% 700 MG/1
5 PATCH TOPICAL
Qty: 1 | Refills: 2 | Status: ACTIVE | COMMUNITY
Start: 2024-01-03 | End: 1900-01-01

## 2024-03-06 NOTE — DISCUSSION/SUMMARY
[Medication Risks Reviewed] : Medication risks reviewed [de-identified] : she is managing with the pain continue with the  lidoderm patches  Will continue to request PT using cane  She is aware of the uterine leimyoma and sees GYN no change in disability  f/u 6 weeks

## 2024-03-06 NOTE — WORK
[Total (100%)] : total (100%) [Does not reveal pre-existing condition(s) that may affect treatment/prognosis] : does not reveal pre-existing condition(s) that may affect treatment/prognosis No [Cane] : cane [Rx may affect patient's ability to return to work, make patient drowsy, or other issue] : Rx may affect patient's ability to return to work, make patient drowsy, or other issue. [I provided the services listed above] :  I provided the services listed above.

## 2024-03-06 NOTE — HISTORY OF PRESENT ILLNESS
[Lower back] : lower back [Neck] : neck [Work related] : work related [Gradual] : gradual [Sudden] : sudden [6] : 6 [5] : 5 [Burning] : burning [Localized] : localized [Shooting] : shooting [Stabbing] : stabbing [Intermittent] : intermittent [Household chores] : household chores [Physical therapy] : physical therapy [Meds] : meds [Walking] : walking [Lying in bed] : lying in bed [Not working due to injury] : Work status: not working due to injury [de-identified] :  2/3/21 - workers comp injury   9/29/21: 45 yo Syriac speaking female referred for surgical evaluation - from the back and down the right leg - some days the back is worse than the legs - activity limiting pain   has tried PT and chiro has tried injections with Dr Miller  has tried ibuprofen  Not using cane   MRi L spine- L5-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   xrays L spine - small spondylolisthesis L5-S1   thyroid  No hx of cancer  not working since the accident  11/24/21: Here for fu - now about 2 weeks post op - wounds dry   xrays today: L spine- implants in good position  1/5/22: here for fu - slowly improving - pain on the right side - has bone stim - not driving yet - wounds healed   xrays today: L spine - implants in good position  2/16/22: Here for fu - plan at last was "PT" - using cane - cant walk very far   6/7/23: Here for fu;    overall the pain remains  right leg with pain left leg is okay  USING BONE STIM  OOW with this injury  Saw WC   xrays today: l spine- implants in good position  3/30/22: Here for f/u. Plan at last visit "handicapped parking due to limited moblity - requires a cane  not able to return to work rec cont with PT" Taking Percocet for pain with relief.  5/11/22: Here for follow-up; ~ 6 months post op; continues to have pain to the right side of the back and buttock that radiates down the right leg at tmies. No new onset weakness. She takes the percocet for pain as needed. She has been doing the PT and ambulating with a cane.   xrays today L-spine 2v - implants in good position  6/15/22: Here for f/u. Last visit plan "Will continue PT - discussed weaning off the percocet which she is amendable to; will send percocet #50 this month. Referrals to outside pain management clinics were given - fu 6 weeks.  A  was used." Overall feels symptoms are worse. Continues PT and medication. right leg shooting pain - left leg is okay - down the side of the right thigh - all the way down - now about 7 months post op - cant stand up for long   PT shes doing   xrays today: l spine - implants in good posotoin AP PELVIS - negative   7/13/22: Here for MRI review.- pain in the lumbar spine has been severe - using cane - pain in the back and down the leg all the way down   MRI L-spine: -Postoperative study following L5-S1 discectomy and lumbosacral fusion without thecal sac or nerve root compression at the operated level. -Mild degenerative disc disease at L3-4. -Small left-sided disc herniation at L4-5 without stenosis or nerve root compression.  10/12/2022: Here for fu; plan last visit was, "reviewed the MRi with her - questions answered - no indication for revision surgery - the surgery looks good unfortunately the pain remains and her activity is very limited, fu 3 months. consideration for SCS, not able to rtw." Overall doing about the same - pain shooting down the right leg. She will be getting an ablation with Dr Miller tomorrow. She is unable to work.   02/08/2023 here today for a workers comp follow up on the lower spine/cervical spine ,pt was doing physical therapy which it was helping ,still on pain,using a cane to ambulate  ,out of work,  neck pain to the right arm - left arm is okay  Lower back to the left leg   xrays today: l spine - alif/psf L5-S1 C spine - spondylosis    03/08/2023 here to review mri results on the cervical  and lower spine no changes since last visit - plan at last was "indicated for MRi C spine indicated for MRI l spine  PT  FU TO REVIEW THE mri NO CHANGE in disablity" - pain in both neck/back continues   MRI C spine [facility] - 1. Straightening of lordosis less than on the prior study. Stable diffuse loss of disc signal. 2. C1-C2: Capsular thickening stable. 3. C3-C4: Minor retrolisthesis. Bulge with central herniation. This is stable from the prior study. 4. C4-C5: Bulge with central herniation. This is stable from the prior study. 5. C5-C6: Bulge with central herniation. This is stable from the prior study. 6. C6-C7: Broad bulge. This is stable from the prior study.  MRi L spine [facility] - 1. Stable convex left curvature, straightening of lordosis. 2. T12-L1: Facet hypertrophy and ligamentum flavum hypertrophy. This is stable from the prior study. 3. L1-L2: Facet hypertrophy and ligamentum flavum hypertrophy. This is stable from the prior study. 4. L2-L3: Facet hypertrophy and ligamentum flavum hypertrophy. This is stable from the prior study. 5. L3-L4: Loss of disc signal. Facet hypertrophy, ligamentum flavum hypertrophy, and facet effusion. This is  stable from the prior study. 6. L4-L5: Grade 1 anterior spondylolisthesis. Broad bulge, facet hypertrophy, and ligamentum flavum  hypertrophy with proximal left foraminal herniation and mild proximal left foraminal stenosis. This is stable from the prior study. 7. L5-S1: Postoperative change with fusion and disc graft in good position. Facet arthrosis, ligamentum flavum  hypertrophy. 8. Uterine leiomyoma with free fluid in the cul-de-sac and ovarian follicles. Ultrasound suggested for further  evaluation.  she was not aware of the fibroid  06/07/2023; fu on the lower back back radiating into the right leg; symptoms worse with prolonged sitting. She is using a cane. Having SCS trial evaluation. She is taking the meloxicam with some relief. Following GYN for the uterine fibroid  08/02/23 follow up workers comp neck and back pt is scheduled to get spine stimulator by Dr Miller 08/11/23. Pain in the back  radiating into the right lower leg; she has been taking tylenol and meloxicam. She is using a cane.   10/04/23  follow up workers comp back , had spinal cord stimulator removed by Dr Miller  last visit. Some days better than others. Difficulty with prolonged walking.   01/03/24 follow up workers comp back  had spinal cord stimulator removed, taking tylenol with some improvement. She  tried the meloxicam without relief.   using a cane to ambulate   03/06/24  Here for follow-up on the lower back; the pain continues but she has good days and bad days. She has been using the  lidocaine patches with some relief. She has been using a cane. Pain is to the right side of the neck and shoots down the arm.  Pain is also to the right side of the back and can shoot down the leg, but this can alternate with the left leg. Cold seems to exacerbate her symptoms.  She is not working.  [] : no [FreeTextEntry1] : L spine  [FreeTextEntry3] : 02/03/2021 [FreeTextEntry5] : work injury  [de-identified] : xrays mris done at [facility]  [de-identified] : physical therapy

## 2024-03-06 NOTE — PHYSICAL EXAM
[Bending to right] : bending to right [4___] : right plantor flexors 4[unfilled]/5 [] : patient ambulates with assistive device [de-identified] : painful strength testing and guarding

## 2024-04-01 ENCOUNTER — APPOINTMENT (OUTPATIENT)
Dept: ORTHOPEDIC SURGERY | Facility: CLINIC | Age: 49
End: 2024-04-01
Payer: OTHER MISCELLANEOUS

## 2024-04-01 VITALS — WEIGHT: 147 LBS | BODY MASS INDEX: 27.75 KG/M2 | HEIGHT: 61 IN

## 2024-04-01 DIAGNOSIS — M76.51 PATELLAR TENDINITIS, RIGHT KNEE: ICD-10-CM

## 2024-04-01 DIAGNOSIS — M75.41 IMPINGEMENT SYNDROME OF RIGHT SHOULDER: ICD-10-CM

## 2024-04-01 DIAGNOSIS — M75.51 BURSITIS OF RIGHT SHOULDER: ICD-10-CM

## 2024-04-01 DIAGNOSIS — M19.011 PRIMARY OSTEOARTHRITIS, RIGHT SHOULDER: ICD-10-CM

## 2024-04-01 PROCEDURE — 99455 WORK RELATED DISABILITY EXAM: CPT

## 2024-04-01 NOTE — WORK
[Has the patient reached Maximum Medical Improvement? If yes, indicate date___] : Yes, on [unfilled] [Is there permanent partial impairment?] : Yes [FreeTextEntry6] : Right shoulder and right knee  [Right] : right [Yes] : Yes [FreeTextEntry7] : Shoulder [FreeTextEntry8] : See above [de-identified] : Full [de-identified] : Loss of motion, weakness, [facility] and pain [FreeTextEntry5] : 35 [de-identified] : See above [de-identified] : Knee [de-identified] : Full [de-identified] : 15 [de-identified] : Patella tendinitis, pain, weakness, use of cane [Not working] : Not working [___lbs] : Occasionally: [unfilled] lbs [] : Occasionally [N/A] : N/A [Light Work] : Light work [Could this patient perform his/her at-injury work activities with restrictions? If yes, specify below.] : Yes [Could this patient perform any work activities with or without restrictions? Explain below.] : Yes [If not working, could reasonable accommodations be made to enable patient to perform work? Explain.] : Yes [Has the patient had an injury/illness since the date of injury which impacts residual functional capacity?] : No [Would the patient benefit from vocational rehabilitation? If Yes, explain below.] :  No [de-identified] : Lumbar and cervical spine [de-identified] : See above [de-identified] : As noted above [de-identified] : Sedentary work

## 2024-04-01 NOTE — HISTORY OF PRESENT ILLNESS
[de-identified] : The patient is a 45 year old R hand dominant female who presents today complaining of neck pain. Date of Injury/Onset: 2/3/21 Pain: At Rest: 7/10  With Activity: 8-9/10  Mechanism of injury: patient fell while at work and injured her right shoulder and right knee and neck. This is a Work Related Injury being treated under Worker's Compensation. This is not an athletic injury occurring associated with an interscholastic or organized sports team. Quality of symptoms: throbbing pain all day, with movement it hurts more Improves with: PT, ice, heat Worse with: Any movement of her head hurts  Treatment/Imaging/Studies Since Last Visit: no longer going to PT Reports Available For Review Today: None Out of work/sport: out of work since 2/3/21 School/Sport/Position/Occupation: [employer]  Change since last visit: Patient states that she is still in a lot of pain  Additional Information: None

## 2024-04-01 NOTE — IMAGING
[de-identified] : The patient is a well appearing 47 year old female of their stated age. Neck is supple. Patient has trapezium tenderness. POSITIVE Spurling's test to the right.  Effected Shoulder: RIGHT   Inspection: Scapula Winging: Negative Deformity: None Erythema: None Ecchymosis: None Abrasions: None Effusion: None  Range of Motion measured 3x active and passive with goniometer, max noted: Active Forward Flexion: 90 degrees  Active Abduction: 90 degrees  Passive Forward Flexion: 160 degrees  Passive Abduction: 160 degrees  ER @ 90 degrees: 90 degrees IR @ 90 degrees: 5 degrees ER @ 0 degrees: 45 degrees  Motor Exam: Forward Flexion: 5 out of 5 Flexion Plane of Scapula: 5 out of 5 Abduction: 5 out of 5 Internal Rotation: 4 out of 5 External Rotation: 4 out of 5 Distal Motor Strength: 5 out of 5  Stability Testing: Anterior: 1+ Posterior: 1+ Sulcus N: 1+ Sulcus ER: 1+  Provocative Tests: Drop Arm: Negative Impingement: Negative  [place]: Negative X-Arm Adduction: POSITIVE, MILDLY  Belly Press: Negative Bear Hug: Negative Lift Off: Negative Apprehension: Negative Relocation:Negative Posterior Load & Shift: Negative  Palpation: AC Joint: TENDER   Clavicle: Nontender SC Joint: Nontender Bicepital Groove: tender Coracoid Process: Nontender Pectoralis Minor Tendon: Nontender Pectoralis Major Tendon: Nontender & palpably intact Proximal Humerus: Nontender Scapula Body: Nontender Medial Scapula Boarder: Nontender Scapula Spine: Nontender  Neurologic Exam: Sensation to Light Touch: Axillary: Grossly intact Ulnar: Grossly intact Radial: Grossly intact Median: Grossly intact Other: N/A Circulatory/Pulses: Ulnar: 2+ Radial: 2+ Other Pertinent Findings: None  Contralateral Shoulder Range of Motion: Active Forward Flexion: 180 degrees  Active Abduction: 180 degrees  Passive Forward Flexion: 180 degrees  Passive Abduction: 180 degrees  ER @ 90 degrees: 90 degrees IR @ 90 degrees: 45 degrees ER @ 0 degrees: 50 degrees Motor Exam: Forward Flexion: 5 out of 5 Flexion Plane of Scapula: 5 out of 5 Abduction: 5 out of 5 Internal Rotation: 5 out of 5 External Rotation: 5 out of 5 Distal Motor Strength: 5 out of 5 Stability Testing: Anterior: 1+ Posterior: 1+ Sulcus N: 1+ Sulcus ER: 1+ Other Pertinent Findings: None   >>>>>>>>>>>>>>>>>>>>>>>>>>>>>>>>>>>>>>>>>>>>>>>>>>>>>>>>>>>>> Patient ambulates with assistance of a cane. Negative straight leg raise bilateral   Effected Knee: RIGHT  ROM measured 3x with goniometer active and passive with max noted:  0-140 degrees [place]ach[place]: Negative Pivot Shift: Negative Anterior Drawer: Negative Posterior Drawer / Sag:Negative Varus Stress 0 degrees: Stable Varus Stress 30 degrees: Stable Valgus Stress 0 degrees: Stable Valgus Stress 30 degrees: Stable Medial Riki: Negative Lateral Riki: Negative Patella Glide: 2+ Patella Apprehension: Negative Patella Grind: Negative Pes [place] Valgus: Negative Pes Cavus: Negative   Palpation: Medial Joint Line: Nontender Lateral Joint Line: Nontender Medial Collateral Ligament: Nontender Lateral Collateral Ligament/PLC: Nontender Distal Femur: Nontender Proximal Tibia: Nontender Tibial Tubercle: Nontender Gerdy's Tubercle: Nontender Distal Pole Patella: Nontender Quadriceps Tendon: Nontender &  Intact Patella Tendon: TENDER &  Intact Medial Femoral Condyle: Nontender Medial Distal Hamstring/PES: Nontender Lateral Distal Hamstring: Nontender & Stable Iliotibial Band: Nontender Medial Patellofemoral Ligament: Nontender Adductor: Nontender Proximal GSC-Plantaris: Nontender Calf: Supple & Nontender   Inspection: Deformity: No Erythema: No Ecchymosis: No Abrasions: No Effusion: No Prepatella Bursitis: No Neurologic Exam: Sensation L4-S1: Grossly Intact Motor Exam: Quadriceps: 3 out of 5 Hamstrings: 4 out of 5 EHL: 5 out of 5 FHL: 5 out of 5 TA: 5 out of 5 GS: 5 out of 5 Circulatory/Pulses: Dorsalis Pedis: 2+ Posterior Tibialis: 2+ Additional Pertinent Findings: None   Contralateral Knee:                             ROM: 0-145 degrees Other Pertinent Findings: None  Assessment: The patient is a 47 year old female with right shoulder bursitis, AC OA, and impingement s/p work related injury, right knee patellar tendonitis and hamstring tendonitis (WC DOI 2/3/21), also being treated by other providers for cervical radiculopathy, s/p lumbar fusion, and seen by another provider for right ankle injury. The patient's condition is chronic and aggravated.  Documents/Results Reviewed Today: None  Tests/Studies Independently Interpreted Today: None  Pertinent findings include: RIGHT SHOULDER: Active ROM: 90/90. Passive ROM: 160/160/90/5/45, tender AC joint, mildly +X-Arm adduction, +spurling, 4/5 IR & ER, 5/5 FF, FSP, and ABD. RIGHT KNEE: 0-140, tender patellar tendon.  Confounding medical conditions/concerns: thyroid disease, lumbar fusion on 11/9/2021. Hx of cervical RFA.   Plan: The patient presents for a scheduled loss of use of right shoulder and right knee. She has been treating her cervical spine. Discussed appropriate activity modifications as the patient continues to advance activity. The patient will follow up on a PRN basis unless new symptoms arise. The patient has reached maximum medical benefit and based on the [facility] 2018 worker's compensation guidelines, schedule loss of use of the right knee is 15% and right shoulder is 35%.  It is within a reasonable degree of medical certainty that the injury is causally related to the work related injury on the date noted above. Tests Ordered: None  Prescription Medications Ordered: None  Braces/DME Ordered: None  Activity/Work/Sports Status: Out of work  Additional Instructions: activity modifications  Follow-Up: 6 weeks  The patient's current medication management of their orthopedic diagnosis was reviewed today: (1) The patient will discontinue their prescribed anti-inflammatory medication. (2) There is a moderate risk of morbidity with further treatment, especially from use of prescription strength medications and possible side effects of these medications which include upset stomach with oral medications, skin reactions to topical medications and cardiac/renal issues with long term use. (3) I recommended that the patient follow-up with their medical physician to discuss any significant specific potential issues with long term medication use such as interactions with current medications or with exacerbation of underlying medical comorbidities. (4) The benefits and risks associated with use of injectable, oral or topical, prescription and over the counter anti-inflammatory medications were discussed with the patient. The patient voiced understanding of the risks including but not limited to bleeding, stroke, kidney dysfunction, heart disease, and were referred to the black box warning label for further information.   I, Anne Perla attest that this documentation has been prepared under the direction and in the presence of Provider Dr. Perez Bruner.   The documentation recorded by the scribe accurately reflects the services I, Dr. Perez Bruner, personally performed and the decisions made by me.

## 2024-04-10 ENCOUNTER — APPOINTMENT (OUTPATIENT)
Dept: ORTHOPEDIC SURGERY | Facility: CLINIC | Age: 49
End: 2024-04-10
Payer: OTHER MISCELLANEOUS

## 2024-04-10 DIAGNOSIS — M47.812 SPONDYLOSIS W/OUT MYELOPATHY OR RADICULOPATHY, CERVICAL REGION: ICD-10-CM

## 2024-04-10 DIAGNOSIS — Z98.1 ARTHRODESIS STATUS: ICD-10-CM

## 2024-04-10 DIAGNOSIS — M47.816 SPONDYLOSIS W/OUT MYELOPATHY OR RADICULOPATHY, LUMBAR REGION: ICD-10-CM

## 2024-04-10 DIAGNOSIS — M54.12 RADICULOPATHY, CERVICAL REGION: ICD-10-CM

## 2024-04-10 PROCEDURE — 99245 OFF/OP CONSLTJ NEW/EST HI 55: CPT

## 2024-04-10 PROCEDURE — 72170 X-RAY EXAM OF PELVIS: CPT

## 2024-04-10 PROCEDURE — 72040 X-RAY EXAM NECK SPINE 2-3 VW: CPT

## 2024-04-10 PROCEDURE — 72100 X-RAY EXAM L-S SPINE 2/3 VWS: CPT

## 2024-04-14 NOTE — PHYSICAL EXAM
[Bending to right] : bending to right [4___] : right plantor flexors 4[unfilled]/5 [Right lower extremity below knee] : right lower extremity below knee [Right lower extremity above knee] : right lower extremity above knee [de-identified] : paresthesias into the right arm  [] : patient ambulates with assistive device [de-identified] : painful strength testing and guarding

## 2024-04-14 NOTE — HISTORY OF PRESENT ILLNESS
[Neck] : neck [Lower back] : lower back [Work related] : work related [Gradual] : gradual [Sudden] : sudden [6] : 6 [5] : 5 [Burning] : burning [Localized] : localized [Shooting] : shooting [Stabbing] : stabbing [Intermittent] : intermittent [Household chores] : household chores [Meds] : meds [Physical therapy] : physical therapy [Walking] : walking [Lying in bed] : lying in bed [Not working due to injury] : Work status: not working due to injury [de-identified] :  2/3/21 - workers comp injury   9/29/21: 47 yo Italian speaking female referred for surgical evaluation - from the back and down the right leg - some days the back is worse than the legs - activity limiting pain   has tried PT and chiro has tried injections with Dr Miller  has tried ibuprofen  Not using cane   MRi L spine- L5-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   xrays L spine - small spondylolisthesis L5-S1   thyroid  No hx of cancer  not working since the accident  11/24/21: Here for fu - now about 2 weeks post op - wounds dry   xrays today: L spine- implants in good position  1/5/22: here for fu - slowly improving - pain on the right side - has bone stim - not driving yet - wounds healed   xrays today: L spine - implants in good position  2/16/22: Here for fu - plan at last was "PT" - using cane - cant walk very far   6/7/23: Here for fu;    overall the pain remains  right leg with pain left leg is okay  USING BONE STIM  OOW with this injury  Saw WC   xrays today: l spine- implants in good position  3/30/22: Here for f/u. Plan at last visit "handicapped parking due to limited moblity - requires a cane  not able to return to work rec cont with PT" Taking Percocet for pain with relief.  5/11/22: Here for follow-up; ~ 6 months post op; continues to have pain to the right side of the back and buttock that radiates down the right leg at tmies. No new onset weakness. She takes the percocet for pain as needed. She has been doing the PT and ambulating with a cane.   xrays today L-spine 2v - implants in good position  6/15/22: Here for f/u. Last visit plan "Will continue PT - discussed weaning off the percocet which she is amendable to; will send percocet #50 this month. Referrals to outside pain management clinics were given - fu 6 weeks.  A  was used." Overall feels symptoms are worse. Continues PT and medication. right leg shooting pain - left leg is okay - down the side of the right thigh - all the way down - now about 7 months post op - cant stand up for long   PT shes doing   xrays today: l spine - implants in good posotoin AP PELVIS - negative   7/13/22: Here for MRI review.- pain in the lumbar spine has been severe - using cane - pain in the back and down the leg all the way down   MRI L-spine: -Postoperative study following L5-S1 discectomy and lumbosacral fusion without thecal sac or nerve root compression at the operated level. -Mild degenerative disc disease at L3-4. -Small left-sided disc herniation at L4-5 without stenosis or nerve root compression.  10/12/2022: Here for fu; plan last visit was, "reviewed the MRi with her - questions answered - no indication for revision surgery - the surgery looks good unfortunately the pain remains and her activity is very limited, fu 3 months. consideration for SCS, not able to rtw." Overall doing about the same - pain shooting down the right leg. She will be getting an ablation with Dr Miller tomorrow. She is unable to work.   02/08/2023 here today for a workers comp follow up on the lower spine/cervical spine ,pt was doing physical therapy which it was helping ,still on pain,using a cane to ambulate  ,out of work,  neck pain to the right arm - left arm is okay  Lower back to the left leg   xrays today: l spine - alif/psf L5-S1 C spine - spondylosis    03/08/2023 here to review mri results on the cervical  and lower spine no changes since last visit - plan at last was "indicated for MRi C spine indicated for MRI l spine  PT  FU TO REVIEW THE mri NO CHANGE in disablity" - pain in both neck/back continues   MRI C spine [facility] - 1. Straightening of lordosis less than on the prior study. Stable diffuse loss of disc signal. 2. C1-C2: Capsular thickening stable. 3. C3-C4: Minor retrolisthesis. Bulge with central herniation. This is stable from the prior study. 4. C4-C5: Bulge with central herniation. This is stable from the prior study. 5. C5-C6: Bulge with central herniation. This is stable from the prior study. 6. C6-C7: Broad bulge. This is stable from the prior study.  MRi L spine [facility] - 1. Stable convex left curvature, straightening of lordosis. 2. T12-L1: Facet hypertrophy and ligamentum flavum hypertrophy. This is stable from the prior study. 3. L1-L2: Facet hypertrophy and ligamentum flavum hypertrophy. This is stable from the prior study. 4. L2-L3: Facet hypertrophy and ligamentum flavum hypertrophy. This is stable from the prior study. 5. L3-L4: Loss of disc signal. Facet hypertrophy, ligamentum flavum hypertrophy, and facet effusion. This is  stable from the prior study. 6. L4-L5: Grade 1 anterior spondylolisthesis. Broad bulge, facet hypertrophy, and ligamentum flavum  hypertrophy with proximal left foraminal herniation and mild proximal left foraminal stenosis. This is stable from the prior study. 7. L5-S1: Postoperative change with fusion and disc graft in good position. Facet arthrosis, ligamentum flavum  hypertrophy. 8. Uterine leiomyoma with free fluid in the cul-de-sac and ovarian follicles. Ultrasound suggested for further  evaluation.  she was not aware of the fibroid  06/07/2023; fu on the lower back back radiating into the right leg; symptoms worse with prolonged sitting. She is using a cane. Having SCS trial evaluation. She is taking the meloxicam with some relief. Following GYN for the uterine fibroid  08/02/23 follow up workers comp neck and back pt is scheduled to get spine stimulator by Dr Miller 08/11/23. Pain in the back  radiating into the right lower leg; she has been taking tylenol and meloxicam. She is using a cane.   10/04/23  follow up workers comp back , had spinal cord stimulator removed by Dr Miller  last visit. Some days better than others. Difficulty with prolonged walking.   01/03/24 follow up workers comp back  had spinal cord stimulator removed, taking tylenol with some improvement. She  tried the meloxicam without relief.   using a cane to ambulate   03/06/24  Here for follow-up on the lower back; the pain continues but she has good days and bad days. She has been using the  lidocaine patches with some relief. She has been using a cane. Pain is to the right side of the neck and shoots down the arm.  Pain is also to the right side of the back and can shoot down the leg, but this can alternate with the left leg. Cold seems to exacerbate her symptoms.  She is not working.   ----------------------------------------------------  4.10.24 here for lower back pain and neck -  [facility]  Most of the pain in lower back and into the right leg - sometimes down to the right knee - left leg is okay - no loss of bb control  using cane for walking when outside   neck pain that radiates into the right arm - left arm is okay - no loss of fine motor   in regards to activities of daily living: can only sit for limited time - has to change positions after about 20 or so minutes - has to have support when sitting can stand for 10 minutes can toilet in the bathroom by herself able to bathe and dress herself - struggles with socks and shoes  able to drive a car - can drive for 5 to 10 minutes only  not able to do chores around the house  has children at home  she cant do chores around the house  using tylenol for pain control  Has children at home that live with her   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

## 2024-04-14 NOTE — WORK
[Total (100%)] : total (100%) [Does not reveal pre-existing condition(s) that may affect treatment/prognosis] : does not reveal pre-existing condition(s) that may affect treatment/prognosis [Cane] : cane [Rx may affect patient's ability to return to work, make patient drowsy, or other issue] : Rx may affect patient's ability to return to work, make patient drowsy, or other issue. [I provided the services listed above] :  I provided the services listed above. [Less than Sedentary Work:] :  Less than Sedentary Work: Unable to meet the requirement of Sedentary Work. [Not working] : Not working [___lbs] : Occasionally: [unfilled] lbs [] : Occasionally [Could this patient perform his/her at-injury work activities with restrictions? If yes, specify below.] : No [Could this patient perform any work activities with or without restrictions? Explain below.] : No [de-identified] : table 11.1 [de-identified] : cervical spine  [de-identified] : B [de-identified] : lumbar spine  [de-identified] : table 11.2 [de-identified] : E (8 points) (range 4-16) [de-identified] : cervical spine - table 11.1 - class 3 - work related injury with persistent pain and positive imaging findings without objective findings- rating B  lumbar spine - table 11.2 - class 4 due to work related pain and surgical intervention and positive tension compression sign - see point allocation below: imaging - no nerve compression noted = 0 points emg/ncs - not done = 0 points str - no loss of str greater than grade 3 nor is there atrophy = 0 points  sensations - paresthesias noted in the right leg = 4 points  reflexes - symmtric = 0 points  tension/comrpression = positive straight leg raise on the right side = 4 points  total - 8 points

## 2024-04-14 NOTE — DISCUSSION/SUMMARY
[Medication Risks Reviewed] : Medication risks reviewed [de-identified] : reviewed the case and the imaging   she is managing with the pain continue with the  lidoderm patches   using cane   no change in disability   she is not able to return to work  she cannot sit or stand for any lenght of time  would rate and unable to return to work in any capacity

## 2024-06-07 ENCOUNTER — OFFICE (OUTPATIENT)
Dept: URBAN - METROPOLITAN AREA CLINIC 94 | Facility: CLINIC | Age: 49
Setting detail: OPHTHALMOLOGY
End: 2024-06-07
Payer: COMMERCIAL

## 2024-06-07 DIAGNOSIS — H52.4: ICD-10-CM

## 2024-06-07 DIAGNOSIS — H16.221: ICD-10-CM

## 2024-06-07 DIAGNOSIS — H16.222: ICD-10-CM

## 2024-06-07 DIAGNOSIS — H40.013: ICD-10-CM

## 2024-06-07 PROBLEM — H16.223 DRY EYE SYNDROME K SICCA; RIGHT EYE, LEFT EYE, BOTH EYES: Status: ACTIVE | Noted: 2024-06-07

## 2024-06-07 PROCEDURE — 83861 MICROFLUID ANALY TEARS: CPT | Mod: LT | Performed by: OPHTHALMOLOGY

## 2024-06-07 PROCEDURE — 83861 MICROFLUID ANALY TEARS: CPT | Mod: RT | Performed by: OPHTHALMOLOGY

## 2024-06-07 PROCEDURE — 99204 OFFICE O/P NEW MOD 45 MIN: CPT | Performed by: OPHTHALMOLOGY

## 2024-06-07 PROCEDURE — 92250 FUNDUS PHOTOGRAPHY W/I&R: CPT | Performed by: OPHTHALMOLOGY

## 2024-06-07 PROCEDURE — 92015 DETERMINE REFRACTIVE STATE: CPT | Performed by: OPHTHALMOLOGY

## 2024-06-07 ASSESSMENT — CONFRONTATIONAL VISUAL FIELD TEST (CVF)
OD_FINDINGS: FULL
OS_FINDINGS: FULL

## 2024-07-19 ENCOUNTER — APPOINTMENT (OUTPATIENT)
Dept: ORTHOPEDIC SURGERY | Facility: HOSPITAL | Age: 49
End: 2024-07-19

## 2024-07-19 PROCEDURE — 99075 MEDICAL TESTIMONY: CPT

## 2024-07-24 ENCOUNTER — APPOINTMENT (OUTPATIENT)
Dept: ORTHOPEDIC SURGERY | Facility: CLINIC | Age: 49
End: 2024-07-24

## 2024-12-13 ENCOUNTER — RX ONLY (RX ONLY)
Age: 49
End: 2024-12-13

## 2024-12-13 ENCOUNTER — OFFICE (OUTPATIENT)
Dept: URBAN - METROPOLITAN AREA CLINIC 94 | Facility: CLINIC | Age: 49
Setting detail: OPHTHALMOLOGY
End: 2024-12-13
Payer: COMMERCIAL

## 2024-12-13 DIAGNOSIS — H01.004: ICD-10-CM

## 2024-12-13 DIAGNOSIS — H16.223: ICD-10-CM

## 2024-12-13 DIAGNOSIS — H25.12: ICD-10-CM

## 2024-12-13 DIAGNOSIS — H40.013: ICD-10-CM

## 2024-12-13 DIAGNOSIS — H01.001: ICD-10-CM

## 2024-12-13 DIAGNOSIS — H25.11: ICD-10-CM

## 2024-12-13 PROCEDURE — 99214 OFFICE O/P EST MOD 30 MIN: CPT | Performed by: OPHTHALMOLOGY

## 2024-12-13 PROCEDURE — 92133 CPTRZD OPH DX IMG PST SGM ON: CPT | Performed by: OPHTHALMOLOGY

## 2024-12-13 PROCEDURE — 76514 ECHO EXAM OF EYE THICKNESS: CPT | Performed by: OPHTHALMOLOGY

## 2024-12-13 ASSESSMENT — CONFRONTATIONAL VISUAL FIELD TEST (CVF)
OD_FINDINGS: FULL
OS_FINDINGS: FULL

## 2024-12-13 ASSESSMENT — KERATOMETRY
OS_AXISANGLE_DEGREES: 093
OD_AXISANGLE_DEGREES: 079
OD_K2POWER_DIOPTERS: 43.75
OS_K2POWER_DIOPTERS: 44.00
OS_K1POWER_DIOPTERS: 43.00
METHOD_AUTO_MANUAL: AUTO
OD_K1POWER_DIOPTERS: 42.75

## 2024-12-13 ASSESSMENT — PACHYMETRY
OD_CT_UM: 575
OD_CT_CORRECTION: -2
OS_CT_CORRECTION: -1
OS_CT_UM: 566

## 2024-12-13 ASSESSMENT — TONOMETRY
OS_IOP_MMHG: 15
OD_IOP_MMHG: 15

## 2024-12-13 ASSESSMENT — REFRACTION_MANIFEST
OD_VA1: 20/20
OS_SPHERE: +0.50
OD_CYLINDER: -0.25
OS_CYLINDER: -0.25
OD_AXIS: 175
OD_VA2: 20/20
OD_ADD: +1.50
OS_ADD: +1.50
OD_SPHERE: +0.75
OS_AXIS: 140
OS_VA1: 20/20
OS_VA2: 20/20

## 2024-12-13 ASSESSMENT — REFRACTION_AUTOREFRACTION
OS_AXIS: 131
OD_AXIS: 174
OD_SPHERE: +0.75
OS_CYLINDER: -0.50
OD_CYLINDER: -0.25
OS_SPHERE: +0.75

## 2024-12-13 ASSESSMENT — LID EXAM ASSESSMENTS
OS_BLEPHARITIS: LUL T
OD_BLEPHARITIS: RUL T

## 2024-12-13 ASSESSMENT — SUPERFICIAL PUNCTATE KERATITIS (SPK)
OD_SPK: T 1+
OS_SPK: T 1+

## 2024-12-13 ASSESSMENT — VISUAL ACUITY
OD_BCVA: 20/20
OS_BCVA: 20/20

## 2025-01-21 ENCOUNTER — OFFICE (OUTPATIENT)
Dept: URBAN - METROPOLITAN AREA CLINIC 110 | Facility: CLINIC | Age: 50
Setting detail: OPHTHALMOLOGY
End: 2025-01-21
Payer: COMMERCIAL

## 2025-01-21 DIAGNOSIS — H40.013: ICD-10-CM

## 2025-01-21 PROCEDURE — 92082 INTERMEDIATE VISUAL FIELD XM: CPT | Performed by: OPHTHALMOLOGY

## 2025-01-21 ASSESSMENT — CONFRONTATIONAL VISUAL FIELD TEST (CVF)
OD_FINDINGS: FULL
OS_FINDINGS: FULL

## 2025-01-21 ASSESSMENT — SUPERFICIAL PUNCTATE KERATITIS (SPK)
OS_SPK: T 1+
OD_SPK: T 1+

## 2025-01-21 ASSESSMENT — LID EXAM ASSESSMENTS
OD_BLEPHARITIS: RUL T
OS_BLEPHARITIS: LUL T

## 2025-01-22 ASSESSMENT — REFRACTION_MANIFEST
OD_AXIS: 175
OD_VA1: 20/20
OS_VA2: 20/20
OS_VA1: 20/20
OD_CYLINDER: -0.25
OS_SPHERE: +0.50
OS_ADD: +1.50
OD_VA2: 20/20
OS_CYLINDER: -0.25
OS_AXIS: 140
OD_SPHERE: +0.75
OD_ADD: +1.50

## 2025-01-22 ASSESSMENT — KERATOMETRY
METHOD_AUTO_MANUAL: AUTO
OS_K2POWER_DIOPTERS: 44.00
OD_K2POWER_DIOPTERS: 43.75
OD_AXISANGLE_DEGREES: 079
OS_AXISANGLE_DEGREES: 093
OS_K1POWER_DIOPTERS: 43.00
OD_K1POWER_DIOPTERS: 42.75

## 2025-01-22 ASSESSMENT — REFRACTION_AUTOREFRACTION
OS_AXIS: 131
OD_SPHERE: +0.75
OS_CYLINDER: -0.50
OD_AXIS: 174
OS_SPHERE: +0.75
OD_CYLINDER: -0.25

## 2025-01-22 ASSESSMENT — VISUAL ACUITY
OS_BCVA: 20/20
OD_BCVA: 20/20

## 2025-02-06 NOTE — PHYSICAL THERAPY INITIAL EVALUATION ADULT - IMPAIRMENTS CONTRIBUTING IMPAIRED BED MOBILITY, REHAB EVAL
Demetrice Garcia discharged to home accompanied by son.   Patient provided with the following educational materials upon discharge:  AVS and oxygen tank.   Valuables and belongings sent with patient.   discharge summary, discharge instructions, medications, and follow up appointments reviewed with patient and son.  Patient and son verbalized understanding.    decreased strength

## 2025-08-20 ENCOUNTER — OFFICE (OUTPATIENT)
Dept: URBAN - METROPOLITAN AREA CLINIC 94 | Facility: CLINIC | Age: 50
Setting detail: OPHTHALMOLOGY
End: 2025-08-20
Payer: COMMERCIAL

## 2025-08-20 DIAGNOSIS — H01.004: ICD-10-CM

## 2025-08-20 DIAGNOSIS — H25.13: ICD-10-CM

## 2025-08-20 DIAGNOSIS — H40.013: ICD-10-CM

## 2025-08-20 DIAGNOSIS — H01.001: ICD-10-CM

## 2025-08-20 PROCEDURE — 92014 COMPRE OPH EXAM EST PT 1/>: CPT | Performed by: OPHTHALMOLOGY

## 2025-08-20 PROCEDURE — 92250 FUNDUS PHOTOGRAPHY W/I&R: CPT | Performed by: OPHTHALMOLOGY

## 2025-08-20 ASSESSMENT — SUPERFICIAL PUNCTATE KERATITIS (SPK)
OD_SPK: T 1+
OS_SPK: T 1+

## 2025-08-20 ASSESSMENT — LID EXAM ASSESSMENTS
OD_BLEPHARITIS: RUL T
OS_BLEPHARITIS: LUL T

## 2025-08-20 ASSESSMENT — CONFRONTATIONAL VISUAL FIELD TEST (CVF)
OS_FINDINGS: FULL
OD_FINDINGS: FULL

## 2025-08-20 ASSESSMENT — PACHYMETRY
OS_CT_CORRECTION: -1
OD_CT_UM: 575
OD_CT_CORRECTION: -2
OS_CT_UM: 566

## 2025-08-20 ASSESSMENT — TONOMETRY
OS_IOP_MMHG: 13
OD_IOP_MMHG: 12

## 2025-08-21 ASSESSMENT — REFRACTION_AUTOREFRACTION
OS_CYLINDER: -0.50
OD_AXIS: 173
OS_AXIS: 129
OD_SPHERE: +0.75
OD_CYLINDER: -0.25
OS_SPHERE: +0.75

## 2025-08-21 ASSESSMENT — REFRACTION_MANIFEST
OS_VA2: 20/20
OD_SPHERE: +0.75
OS_SPHERE: +0.50
OS_AXIS: 140
OD_VA2: 20/20
OD_VA1: 20/20
OD_AXIS: 175
OD_CYLINDER: -0.25
OS_CYLINDER: -0.25
OS_VA1: 20/20
OD_ADD: +1.50
OS_ADD: +1.50

## 2025-08-21 ASSESSMENT — KERATOMETRY
METHOD_AUTO_MANUAL: AUTO
OD_K1POWER_DIOPTERS: 42.75
OS_K2POWER_DIOPTERS: 44.00
OS_K1POWER_DIOPTERS: 43.00
OD_K2POWER_DIOPTERS: 43.75
OD_AXISANGLE_DEGREES: 079
OS_AXISANGLE_DEGREES: 090

## 2025-08-21 ASSESSMENT — VISUAL ACUITY
OD_BCVA: 20/20
OS_BCVA: 20/20